# Patient Record
Sex: FEMALE | Race: WHITE | NOT HISPANIC OR LATINO | Employment: PART TIME | URBAN - METROPOLITAN AREA
[De-identification: names, ages, dates, MRNs, and addresses within clinical notes are randomized per-mention and may not be internally consistent; named-entity substitution may affect disease eponyms.]

---

## 2019-04-03 ENCOUNTER — HOSPITAL ENCOUNTER (EMERGENCY)
Facility: CLINIC | Age: 19
Discharge: HOME | End: 2019-04-03
Attending: PHYSICIAN ASSISTANT | Admitting: PHYSICIAN ASSISTANT
Payer: COMMERCIAL

## 2019-04-03 VITALS
TEMPERATURE: 99.8 F | SYSTOLIC BLOOD PRESSURE: 124 MMHG | OXYGEN SATURATION: 100 % | RESPIRATION RATE: 20 BRPM | DIASTOLIC BLOOD PRESSURE: 82 MMHG | HEART RATE: 108 BPM

## 2019-04-03 DIAGNOSIS — N76.0 VAGINITIS AND VULVOVAGINITIS: ICD-10-CM

## 2019-04-03 LAB
HCG UR QL: NEGATIVE
SPECIMEN SOURCE: NORMAL
WET PREP SPEC: NORMAL

## 2019-04-03 PROCEDURE — 99283 EMERGENCY DEPT VISIT LOW MDM: CPT

## 2019-04-03 PROCEDURE — G0463 HOSPITAL OUTPT CLINIC VISIT: HCPCS | Performed by: PHYSICIAN ASSISTANT

## 2019-04-03 PROCEDURE — 99213 OFFICE O/P EST LOW 20 MIN: CPT | Mod: Z6 | Performed by: PHYSICIAN ASSISTANT

## 2019-04-03 PROCEDURE — 87491 CHLMYD TRACH DNA AMP PROBE: CPT | Performed by: PHYSICIAN ASSISTANT

## 2019-04-03 PROCEDURE — 87591 N.GONORRHOEAE DNA AMP PROB: CPT | Performed by: PHYSICIAN ASSISTANT

## 2019-04-03 PROCEDURE — 87210 SMEAR WET MOUNT SALINE/INK: CPT | Performed by: PHYSICIAN ASSISTANT

## 2019-04-03 PROCEDURE — 81025 URINE PREGNANCY TEST: CPT | Performed by: PHYSICIAN ASSISTANT

## 2019-04-03 RX ORDER — FLUCONAZOLE 150 MG/1
TABLET ORAL
Qty: 2 TABLET | Refills: 0 | Status: SHIPPED | OUTPATIENT
Start: 2019-04-03 | End: 2019-04-06

## 2019-04-03 NOTE — ED PROVIDER NOTES
"  History     Chief Complaint   Patient presents with     Vaginitis     HPI  Reyna Rose is a 18 year old female who presents to the urgent care with concerns over vaginal itching and discharge is been present for the last 2 days.  She also states that her LPM was 3/27/19 and was heavy and had more cramping than she typically expected.  She denies any fever, chills, myalgias, cough, nausea, vomiting,diarrhea, dysuria, increased urinary frequency, urgency, hematuria, or current vaginal bleeding.  She has not had any history of similar symptoms previously.  She also reports concern that her boyfriend was recently diagnosed with epididymitis.      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  [facility] [place] [place] and was told that Reyna would grow to only about 5'2\" per Reyna's mom.  0.45% of growth percentile based on stature-for-age.  0.06% of growth percentile based on weight-for-age.  1.92% of growth percentile based on BMI-for-age.           Underweight 12/11/2008     Priority: Medium     April 6, 2009: Andreas Mom is 5'5\" and Reyna's Dad is 5'5\" .  At [place]'s [facility], [facility] [place] and was told that Reyna would grow to only about 5'2\" per Reyna's mom.  0.45% of growth percentile based on stature-for-age.  0.06% of growth percentile based on weight-for-age.  1.92% of growth percentile based on BMI-for-age.         Anxiety NOS 12/11/2008     Priority: Medium     April 6, 2009: Reyna has been followed by psychologist Delia in [facility]. " Reyna has completed her therapy. Reyna is doing well at school and Reyna's mom reports that Delia felt that no further follow up is needed at this time.          Past Medical History:    No past medical history on file.    Past Surgical History:    No past surgical history on file.    Family History:    Family History   Problem Relation Age of Onset     Gastrointestinal Disease Mother         GERD     Lipids Maternal Grandmother      Allergies Maternal Grandmother         many med allergies     Heart Disease Maternal Grandfather      Heart Disease Paternal Grandmother         MI at 53yo     Social History:  Marital Status:  Single [1]  Social History     Tobacco Use     Smoking status: Never Smoker   Substance Use Topics     Alcohol use: No     Drug use: No      Medications:      CHILDRENS MULTIVITAMINS OR CHEW   FLUoxetine (PROZAC) 20 MG/5ML solution     Review of Systems  CONSTITUTIONAL:NEGATIVE for fever, chills, change in weight  INTEGUMENTARY/SKIN: POSITIVE for erythema of external genitalia NEGATIVE for other worrisome rashes, moles or lesions  RESP:NEGATIVE for significant cough or SOB  GI: NEGATIVE for nausea, vomiting, diarrhea, or abdominal pain   : POSITIVE for vaginal itching, discharge NEGATIVE for dysuria, frequency, urgency, hematuria   Physical Exam   BP: 124/82  Pulse: 108  Temp: 99.8  F (37.7  C)  Resp: 20  SpO2: 100 %  Physical Exam  GENERAL APPEARANCE: healthy, alert and no distress  RESP: lungs clear to auscultation - no rales, rhonchi or wheezes  CV: regular rates and rhythm, normal S1 S2, no murmur noted  ABDOMEN:  soft, nontender, no HSM or masses and bowel sounds normal  BACK: No CVA tenderness  GU_female: external genitalia has infrequent pustule lesions, vagina has thick dischargepresent, cervix not visible due to discharge and procedure was discontinued early due to patient's discomfort.    SKIN: no suspicious lesions or rashes  ED Course        Procedures        Critical Care time:   none        No results found for this or any previous visit (from the past 24 hour(s)).    Medications - No data to display    Assessments & Plan (with Medical Decision Making)     I have reviewed the nursing notes.    I have reviewed the findings, diagnosis, plan and need for follow up with the patient.          Medication List      Started    fluconazole 150 MG tablet  Commonly known as:  DIFLUCAN  Take one tablet now, and one tablet in three days          Final diagnoses:   Vaginitis and vulvovaginitis     19-year-old female presents to urgent care with concern over several day history of vaginal itching burning discharge.  She had elevated heart rate upon arrival, remainder of vital signs within normal limits.  Physical exam findings as described above for infrequent internal genitalia white discharge present in the vagina, however speculum exam was discontinued early due to patient's discomfort level.  She had wet prep which was negative for evidence of, bacterial vaginosis, trichomonas.  However, exam was consistent with candidal infection, I discussed risk/benefits of empiric treatment with diflucan and patient elected to proceed.  Testing for chlamydia and gonorrhea pending at time of discharge.  She was discharged home stable with instructions to follow up with PCP if no improvement in 3-5 days.  Worrisome reasons to return to ER/UC sooner discussed.     Disclaimer: This note consists of symbols derived from keyboarding, dictation, and/or voice recognition software. As a result, there may be errors in the script that have gone undetected.  Please consider this when interpreting information found in the chart.    4/3/2019   [facility] EMERGENCY DEPARTMENT     Kelly Rosen PA-C  04/05/19 0930

## 2019-04-03 NOTE — ED AVS SNAPSHOT
[facility] Emergency Department  [address] [address]  Phone:  [phone]  Fax:  [phone]                                    Reyna Rose   MRN: [id number]    Department:  [facility] Emergency Department   Date of Visit:  4/3/2019           After Visit Summary Signature Page    I have received my discharge instructions, and my questions have been answered. I have discussed any challenges I see with this plan with the nurse or doctor.    ..........................................................................................................................................  Patient/Patient Representative Signature      ..........................................................................................................................................  Patient Representative Print Name and Relationship to Patient    ..................................................               ................................................  Date                                   Time    ..........................................................................................................................................  Reviewed by Signature/Title    ...................................................              ..............................................  Date                                               Time          22EPIC Rev 08/18

## 2019-04-04 LAB
C TRACH DNA SPEC QL NAA+PROBE: NEGATIVE
N GONORRHOEA DNA SPEC QL NAA+PROBE: NEGATIVE
SPECIMEN SOURCE: NORMAL
SPECIMEN SOURCE: NORMAL

## 2019-04-04 NOTE — RESULT ENCOUNTER NOTE
Final result for both N. Gonorrhoeae PCR and Chlamydia Trachomatis PCR are NEGATIVE.  No treatment or change in treatment per [place] ED Lab Result protocol.

## 2020-11-19 ENCOUNTER — HOSPITAL ENCOUNTER (EMERGENCY)
Facility: CLINIC | Age: 20
Discharge: HOME | End: 2020-11-19
Attending: PHYSICIAN ASSISTANT | Admitting: PHYSICIAN ASSISTANT
Payer: COMMERCIAL

## 2020-11-19 VITALS
RESPIRATION RATE: 16 BRPM | SYSTOLIC BLOOD PRESSURE: 136 MMHG | TEMPERATURE: 97.4 F | WEIGHT: 83 LBS | OXYGEN SATURATION: 100 % | DIASTOLIC BLOOD PRESSURE: 85 MMHG | HEART RATE: 97 BPM

## 2020-11-19 DIAGNOSIS — N89.8 VAGINAL DISCHARGE: ICD-10-CM

## 2020-11-19 DIAGNOSIS — N39.0 URINARY TRACT INFECTION: ICD-10-CM

## 2020-11-19 LAB
ALBUMIN UR-MCNC: 100 MG/DL
APPEARANCE UR: ABNORMAL
BACTERIA #/AREA URNS HPF: ABNORMAL /HPF
BILIRUB UR QL STRIP: NEGATIVE
COLOR UR AUTO: YELLOW
GLUCOSE UR STRIP-MCNC: NEGATIVE MG/DL
HCG UR QL: NEGATIVE
HGB UR QL STRIP: ABNORMAL
KETONES UR STRIP-MCNC: NEGATIVE MG/DL
LEUKOCYTE ESTERASE UR QL STRIP: ABNORMAL
MUCOUS THREADS #/AREA URNS LPF: PRESENT /LPF
NITRATE UR QL: NEGATIVE
PH UR STRIP: 5 PH (ref 5–7)
RBC #/AREA URNS AUTO: >182 /HPF (ref 0–2)
SOURCE: ABNORMAL
SP GR UR STRIP: 1.03 (ref 1–1.03)
SPECIMEN SOURCE: NORMAL
SQUAMOUS #/AREA URNS AUTO: 2 /HPF (ref 0–1)
UROBILINOGEN UR STRIP-MCNC: 2 MG/DL (ref 0–2)
WBC #/AREA URNS AUTO: 38 /HPF (ref 0–5)
WET PREP SPEC: NORMAL

## 2020-11-19 PROCEDURE — 87086 URINE CULTURE/COLONY COUNT: CPT | Performed by: PHYSICIAN ASSISTANT

## 2020-11-19 PROCEDURE — 87210 SMEAR WET MOUNT SALINE/INK: CPT | Performed by: PHYSICIAN ASSISTANT

## 2020-11-19 PROCEDURE — 87591 N.GONORRHOEAE DNA AMP PROB: CPT | Performed by: PHYSICIAN ASSISTANT

## 2020-11-19 PROCEDURE — 81001 URINALYSIS AUTO W/SCOPE: CPT | Performed by: PHYSICIAN ASSISTANT

## 2020-11-19 PROCEDURE — 87491 CHLMYD TRACH DNA AMP PROBE: CPT | Performed by: PHYSICIAN ASSISTANT

## 2020-11-19 PROCEDURE — 99214 OFFICE O/P EST MOD 30 MIN: CPT | Performed by: PHYSICIAN ASSISTANT

## 2020-11-19 PROCEDURE — G0463 HOSPITAL OUTPT CLINIC VISIT: HCPCS | Performed by: PHYSICIAN ASSISTANT

## 2020-11-19 PROCEDURE — 81025 URINE PREGNANCY TEST: CPT | Performed by: PHYSICIAN ASSISTANT

## 2020-11-19 RX ORDER — CEPHALEXIN 250 MG/5ML
500 POWDER, FOR SUSPENSION ORAL 2 TIMES DAILY
Qty: 140 ML | Refills: 0 | Status: SHIPPED | OUTPATIENT
Start: 2020-11-19 | End: 2020-11-26

## 2020-11-19 ASSESSMENT — ENCOUNTER SYMPTOMS
MUSCULOSKELETAL NEGATIVE: 1
HEMATURIA: 1
CONSTITUTIONAL NEGATIVE: 1
GASTROINTESTINAL NEGATIVE: 1
DYSURIA: 1
FREQUENCY: 1

## 2020-11-19 NOTE — ED AVS SNAPSHOT
[facility] Emergency Dept  [address] [address]  Phone: [phone]  Fax: [phone]                                    Reyna Rose   MRN: [id number]    Department: [facility] Emergency Dept   Date of Visit: 11/19/2020           After Visit Summary Signature Page    I have received my discharge instructions, and my questions have been answered. I have discussed any challenges I see with this plan with the nurse or doctor.    ..........................................................................................................................................  Patient/Patient Representative Signature      ..........................................................................................................................................  Patient Representative Print Name and Relationship to Patient    ..................................................               ................................................  Date                                   Time    ..........................................................................................................................................  Reviewed by Signature/Title    ...................................................              ..............................................  Date                                               Time          22EPIC Rev 08/18

## 2020-11-20 ENCOUNTER — TELEPHONE (OUTPATIENT)
Dept: EMERGENCY MEDICINE | Facility: CLINIC | Age: 20
End: 2020-11-20

## 2020-11-20 LAB
BACTERIA SPEC CULT: NO GROWTH
C TRACH DNA SPEC QL NAA+PROBE: POSITIVE
Lab: NORMAL
N GONORRHOEA DNA SPEC QL NAA+PROBE: NEGATIVE
SPECIMEN SOURCE: ABNORMAL
SPECIMEN SOURCE: NORMAL
SPECIMEN SOURCE: NORMAL

## 2020-11-20 RX ORDER — ONDANSETRON 4 MG/1
4 TABLET, ORALLY DISINTEGRATING ORAL ONCE
Qty: 1 TABLET | Refills: 0 | Status: SHIPPED | OUTPATIENT
Start: 2020-11-20 | End: 2020-11-20

## 2020-11-20 RX ORDER — AZITHROMYCIN 500 MG/1
1000 TABLET, FILM COATED ORAL ONCE
Qty: 2 TABLET | Refills: 0 | Status: SHIPPED | OUTPATIENT
Start: 2020-11-20 | End: 2020-11-20

## 2020-11-20 NOTE — RESULT ENCOUNTER NOTE
Final Chlamydia trachomatis PCR on 11/20/20 is POSITIVE for C. trachomatis rRNA by transcription mediated amplification.  Patient was treated appropriately in the ED [Yes or No]:   No         [facility] ED discharge antibiotic (if prescribed): Cephalexin (Keflex) 250 MG/5ML susp, 10 mLs (500 mg) by mouth 2 times daily for 7 days  If no treatment initiated in the [place] ED, treat per [place] ED Lab Result protocol.

## 2020-11-20 NOTE — TELEPHONE ENCOUNTER
[facility] Emergency Department Lab result notification [Adult-Female]    [place] ED lab result protocol used  Chlamydia T    Reason for call  Notify of lab results, assess symptoms,  review ED providers recommendations/discharge instructions (if necessary) and advise per ED lab result f/u protocol    Lab Result (including Rx patient on, if applicable)  Final Chlamydia trachomatis PCR on 11/20/20 is POSITIVE for C. trachomatis rRNA by transcription mediated amplification.  Patient was treated appropriately in the ED [Yes or No]:   No         [facility] ED discharge antibiotic (if prescribed): Cephalexin (Keflex) 250 MG/5ML susp, 10 mLs (500 mg) by mouth 2 times daily for 7 days  If no treatment initiated in the [place] ED, treat per [place] ED Lab Result protocol.    Information table from ED Provider visit on 11/19/20  Symptoms reported at ED visit (Chief complaint, HPI) Chief Complaint   Patient presents with     Rule out Urinary Tract Infection      HPI  Reyna Rose is a 20 year old female who presents with complaints of dysuria, hematuria, and increased urinary urgency and frequency.  Pt also c/o a new brownish vaginal discharge.  Pt also c/o associated suprapubic abdominal pressure and diffuse low back pain.  Denies fevers, chills, nausea, vomiting, or flank pain.  Pt has had a new sexual partner.  No known STD exposure.     Significant Medical hx, if applicable (i.e. CKD, diabetes) NA   Allergies No Known Allergies   Weight, if applicable Wt Readings from Last 2 Encounters:   11/19/20 37.6 kg (83 lb)   09/20/15 35.3 kg (77 lb 12.8 oz) (<1 %, Z= -3.95)*     * Growth percentiles are based on CDC (Girls, 2-20 Years) data.      Coumadin/Warfarin [Yes /No] No   Creatinine Level (mg/dl) Creatinine   Date Value Ref Range Status   04/06/2009 0.56 0.39 - 0.73 mg/dL Final     Comment:     New IDMS-traceable calibration  beginning 5/1/08      Creatinine clearance (ml/min), if applicable  Creatinine clearance cannot be calculated (Patient's most recent lab result is older than the maximum 10 days allowed.)   Pregnant (Yes/No/NA) HCG qual negative   Breastfeeding (Yes/No/NA) ?   ED providers Impression and Plan (applicable information) Pt is a 20 year old female who presents with complaints of dysuria, hematuria, and increased urinary urgency and frequency.  Pt also c/o a new brownish vaginal discharge.  Pt also c/o associated suprapubic abdominal pressure and diffuse low back pain.  Denies fevers, chills, nausea, vomiting, or flank pain.  Pt has had a new sexual partner.  No known STD exposure.      Pt is afebrile on arrival.  Exam as above.  Urinalysis was positive for >182 RBCs and 38 WBCs.  Culture is pending.  UPT was negative.  Wet prep was negative.  Gonorrhea and Chlamydia PCRs are pending.  Discussed results with patient.  Return precautions were reviewed.  Hand-outs were provided.     Patient was sent with Keflex and was instructed to follow-up with PCP if no improvement in 3-5 days for continued care and management or sooner if new or worsening symptoms.  She is to return to the ED for persistent and/or worsening symptoms.  Patient expressed understanding of the diagnosis and plan and was discharged home in good condition.   ED diagnosis Urinary tract infection   Vaginal discharge      ED provider Rosalina Saha PA-C      RN Assessment (Patient s current Symptoms), include time called.  [Insert Left message here if message left]  At 1:15P, Left voicemail message requesting a call back to [facility] ED Lab Result RN at [phone].  RN is available every day between 10 a.m. and 6:30 p.m..      [RN Name]  Tom Graham RN  [facility]  Emergency Dept Lab Result RN  Ph# [phone]    Copy of Lab result   Component      Latest Ref Rng & Units 11/19/2020   Specimen Description       Vagina   Chlamydia Trachomatis PCR      NEG:Negative Positive (A)    Specimen Descrip       Vagina   N Gonorrhea PCR      NEG:Negative Negative

## 2020-11-20 NOTE — RESULT ENCOUNTER NOTE
Final N. Gonorrhoeae PCR is NEGATIVE.   No treatment or change in treatment per [place] ED Lab Result protocol.

## 2020-11-20 NOTE — RESULT ENCOUNTER NOTE
Notified of positive chlamydia T PCR result and treatment recommendations.  Rx for Azithromycin 1000 mg Tablet, I tablet PO x 1 and Zofran 4 mg ODT (take 1 hour prior to taking the Azithromycin)

## 2020-11-20 NOTE — RESULT ENCOUNTER NOTE
Emergency Dept/Urgent Care discharge antibiotic (if prescribed): Cephalexin (Keflex) 250 MG/5ML susp, 10 mLs (500 mg) by mouth 2 times daily for 7 days  Date of Rx (if applicable):  11/19/20  No changes in treatment per Urine culture protocol.

## 2020-11-20 NOTE — TELEPHONE ENCOUNTER
[facility] Emergency Department/Urgent Care Lab result notification     Patient/parent Name  Reyna    RN Assessment (Patient s current Symptoms), include time called.  [Insert Left message here if message left]  Continues to have discomfort with urination,  Urgency and frequency    Lab result (if applicable):  Final Chlamydia trachomatis PCR on 11/20/20 is POSITIVE for C. trachomatis rRNA by transcription mediated amplification.  Patient was treated appropriately in the ED [Yes or No]:   No         [facility] ED discharge antibiotic (if prescribed): Cephalexin (Keflex) 250 MG/5ML susp, 10 mLs (500 mg) by mouth 2 times daily for 7 days  If no treatment initiated in the [place] ED, treat per [place] ED Lab Result protocol.    RN Recommendations/Instructions per [place] ED lab result protocol  Notified of positive chlamydia T PCR result and treatment recommendations.  Rx for Azithromycin 1000 mg Tablet, I tablet PO x 1 and Zofran 4 mg ODT (take 1 hour prior to taking the Azithromycin)    Reviewed STD Patient Instructions:    We recommend that you contact any recent sexual partners within the last 2 months and have them evaluated by a physician.    Avoid sexual activity for 7 to 10 days or until both your and your partner(s) have completed all antibiotic medications.    We advise that you consider following up with your PCP at approximately 3 months for retesting to be sure the infection has cleared.       Please Contact your PCP clinic or return to the Emergency department if your:    Symptoms worsen or other concerning symptom's.    PCP follow-up Questions asked: NO    [RN Name]  Tom Graham RN  [facility]  Emergency Dept Lab Result RN  Ph# [phone]

## 2020-11-20 NOTE — ED PROVIDER NOTES
"  History     Chief Complaint   Patient presents with     Rule out Urinary Tract Infection     HPI  Reyna Rose is a 20 year old female who presents with complaints of dysuria, hematuria, and increased urinary urgency and frequency.  Pt also c/o a new brownish vaginal discharge.  Pt also c/o associated suprapubic abdominal pressure and diffuse low back pain.  Denies fevers, chills, nausea, vomiting, or flank pain.  Pt has had a new sexual partner.  No known STD exposure.      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  At Reyna's previous clinic, [facility] in [place], Reyna had a bone scan and was told that Reyna would grow to only about 5'2\" per Reyna's mom.  0.45% of growth percentile based on stature-for-age.  0.06% of growth percentile based on weight-for-age.  1.92% of growth percentile based on BMI-for-age.           Underweight 12/11/2008     Priority: Medium     April 6, 2009: Reyna's Mom is 5'5\" and Reyna's Dad is 5'5\" .  At Reyna's previous clinic, [facility] in [place], Reyna had a bone scan and was told that Reyna would grow to only about 5'2\" per Reyna's mom.  0.45% of growth percentile based on stature-for-age.  0.06% of growth percentile based on weight-for-age.  1.92% of growth percentile based on BMI-for-age.         Anxiety NOS 12/11/2008     Priority: Medium     April 6, 2009: Reyna has been followed by psychologist Delia in [facility]. Reyna has completed her therapy. Reyna is doing well at school and Reyna's mom reports that Delia felt that no further follow up is needed at this time.          Past " Medical History:    No past medical history on file.    Past Surgical History:    No past surgical history on file.    Family History:    Family History   Problem Relation Age of Onset     Gastrointestinal Disease Mother         GERD     Lipids Maternal Grandmother      Allergies Maternal Grandmother         many med allergies     Heart Disease Maternal Grandfather      Heart Disease Paternal Grandmother         MI at 53yo       Social History:  Marital Status:  Single [1]  Social History     Tobacco Use     Smoking status: Never Smoker   Substance Use Topics     Alcohol use: No     Drug use: No        Medications:         cephALEXin (KEFLEX) 250 MG/5ML suspension       CHILDRENS MULTIVITAMINS OR CHEW       FLUoxetine (PROZAC) 20 MG/5ML solution          Review of Systems   Constitutional: Negative.    Gastrointestinal: Negative.    Genitourinary: Positive for dysuria, frequency, hematuria, urgency and vaginal discharge.   Musculoskeletal: Negative.    Skin: Negative.    All other systems reviewed and are negative.      Physical Exam   BP: 136/85  Pulse: 97  Temp: 97.4  F (36.3  C)  Resp: 16  Weight: 37.6 kg (83 lb)  SpO2: 100 %      Physical Exam  Constitutional:       General: She is not in acute distress.     Appearance: Normal appearance. She is not ill-appearing, toxic-appearing or diaphoretic.   HENT:      Head: Normocephalic and atraumatic.      Mouth/Throat:      Mouth: Mucous membranes are moist.   Eyes:      Conjunctiva/sclera: Conjunctivae normal.   Neck:      Musculoskeletal: Neck supple.   Pulmonary:      Effort: Pulmonary effort is normal.   Abdominal:      General: There is no distension.      Palpations: Abdomen is soft.      Tenderness: There is no abdominal tenderness. There is no right CVA tenderness, left CVA tenderness, guarding or rebound.   Genitourinary:     Labia:         Right: No rash, tenderness or lesion.         Left: No rash, tenderness or lesion.       Vagina: Vaginal discharge  present. No tenderness, bleeding or lesions.      Cervix: Friability present. No cervical motion tenderness.      Rectum: External hemorrhoid:     Musculoskeletal: Normal range of motion.   Skin:     General: Skin is warm and dry.   Neurological:      General: No focal deficit present.      Mental Status: She is alert.         ED Course        Procedures    Results for orders placed or performed during the hospital encounter of 11/19/20 (from the past 24 hour(s))   UA reflex to Microscopic   Result Value Ref Range    Color Urine Yellow     Appearance Urine Slightly Cloudy     Glucose Urine Negative NEG^Negative mg/dL    Bilirubin Urine Negative NEG^Negative    Ketones Urine Negative NEG^Negative mg/dL    Specific Gravity Urine 1.032 1.003 - 1.035    Blood Urine Moderate (A) NEG^Negative    pH Urine 5.0 5.0 - 7.0 pH    Protein Albumin Urine 100 (A) NEG^Negative mg/dL    Urobilinogen mg/dL 2.0 0.0 - 2.0 mg/dL    Nitrite Urine Negative NEG^Negative    Leukocyte Esterase Urine Small (A) NEG^Negative    Source Midstream Urine     RBC Urine >182 (H) 0 - 2 /HPF    WBC Urine 38 (H) 0 - 5 /HPF    Bacteria Urine Few (A) NEG^Negative /HPF    Squamous Epithelial /HPF Urine 2 (H) 0 - 1 /HPF    Mucous Urine Present (A) NEG^Negative /LPF   HCG qualitative urine (UPT)   Result Value Ref Range    HCG Qual Urine Negative NEG^Negative   Wet prep    Specimen: Vagina   Result Value Ref Range    Specimen Description Vagina     Wet Prep WBC'S seen  Moderate       Wet Prep No yeast seen     Wet Prep No clue cells seen     Wet Prep No Trichomonas seen        Medications - No data to display    Assessments & Plan (with Medical Decision Making)     Pt is a 20 year old female who presents with complaints of dysuria, hematuria, and increased urinary urgency and frequency.  Pt also c/o a new brownish vaginal discharge.  Pt also c/o associated suprapubic abdominal pressure and diffuse low back pain.  Denies fevers, chills, nausea, vomiting, or  flank pain.  Pt has had a new sexual partner.  No known STD exposure.     Pt is afebrile on arrival.  Exam as above.  Urinalysis was positive for >182 RBCs and 38 WBCs.  Culture is pending.  UPT was negative.  Wet prep was negative.  Gonorrhea and Chlamydia PCRs are pending.  Discussed results with patient.  Return precautions were reviewed.  Hand-outs were provided.    Patient was sent with Keflex and was instructed to follow-up with PCP if no improvement in 3-5 days for continued care and management or sooner if new or worsening symptoms.  She is to return to the ED for persistent and/or worsening symptoms.  Patient expressed understanding of the diagnosis and plan and was discharged home in good condition.    I have reviewed the nursing notes.    I have reviewed the findings, diagnosis, plan and need for follow up with the patient.    New Prescriptions    CEPHALEXIN (KEFLEX) 250 MG/5ML SUSPENSION    Take 10 mLs (500 mg) by mouth 2 times daily for 7 days       Final diagnoses:   Urinary tract infection   Vaginal discharge       11/19/2020   [facility] EMERGENCY DEPT      Disclaimer:  This note consists of symbols derived from keyboarding, dictation and/or voice recognition software.  As a result, there may be errors in the script that have gone undetected.  Please consider this when interpreting information found in this chart.     Rosalina Saha PA-C  11/19/20 1951

## 2020-11-22 NOTE — TELEPHONE ENCOUNTER
"[facility] Emergency Department/Urgent Care Lab result notification:    [place] ED lab result protocol used  Urine culture    Reason for call  Notify of lab results, assess symptoms,  review ED providers recommendations/discharge instructions (if necessary) and advise per ED lab result f/u protocol    Lab Result   Final urine culture report shows \"NO GROWTH\" and is NEGATIVE.  Emergency Dept discharge antibiotic: Cephalexin (Keflex) 250 MG/5ML susp, 10 mLs (500 mg) by mouth 2 times daily for 7 days  Is ED discharge Rx antibiotic for UTI only (Yes/No): Yes  Recommendations per [place] ED Lab result protocol - Urine culture protocol.    Information table from ED Provider visit on 11/19/20  Symptoms reported at ED visit (Chief complaint, HPI) Chief Complaint   Patient presents with     Rule out Urinary Tract Infection      HPI  Reyna Rose is a 20 year old female who presents with complaints of dysuria, hematuria, and increased urinary urgency and frequency.  Pt also c/o a new brownish vaginal discharge.  Pt also c/o associated suprapubic abdominal pressure and diffuse low back pain.  Denies fevers, chills, nausea, vomiting, or flank pain.  Pt has had a new sexual partner.  No known STD exposure.     ED providers Impression and Plan (applicable information) Pt is a 20 year old female who presents with complaints of dysuria, hematuria, and increased urinary urgency and frequency.  Pt also c/o a new brownish vaginal discharge.  Pt also c/o associated suprapubic abdominal pressure and diffuse low back pain.  Denies fevers, chills, nausea, vomiting, or flank pain.  Pt has had a new sexual partner.  No known STD exposure.      Pt is afebrile on arrival.  Exam as above.  Urinalysis was positive for >182 RBCs and 38 WBCs.  Culture is pending.  UPT was negative.  Wet prep was negative.  Gonorrhea and Chlamydia PCRs are pending.  Discussed results with patient.  Return precautions were reviewed.  Hand-outs " "were provided.     Patient was sent with Keflex and was instructed to follow-up with PCP if no improvement in 3-5 days for continued care and management or sooner if new or worsening symptoms.  She is to return to the ED for persistent and/or worsening symptoms.  Patient expressed understanding of the diagnosis and plan and was discharged home in good condition.   Miscellaneous information NA     RN Assessment (Patient s current Symptoms), include time called.  [Insert Left message here if message left]  \"I felt nauseous after taking the Antibiotic that you gave me last night.  Feeling better today.    Urinary sx's still persisting.      RN Recommendations/Instructions per [place] ED lab result protocol  Patient notified of lab result and treatment recommendations.    Confirmed she did not take any antibiotic's prior to the ED.  Advised to discontinue the Cephalexin/Keflex    Please Contact your PCP clinic or return to the Emergency department if your:    Symptoms do not improve after 3 days on antibiotic.    Symptoms worsen or other concerning symptom's.      [RN Name]  Tom Graham RN  [employer] [facility]  Emergency Dept Lab Result RN  Ph# [phone]      Copy of Lab result   Urine Culture  Order: [id number]  Status:  Final result   Visible to patient:  No (inaccessible in MyChart)   Dx:  Urinary tract infection  Specimen Information: Midstream Urine        (important suggestion)  Newer results are available. Click to view them now.   Component 2d ago   Specimen Description Midstream Urine    Special Requests Specimen received in preservative    Culture Micro No growth    [facility]         Specimen Collected: 11/19/20  6:53 PM   Last Resulted: 11/20/20  9:54 PM               "

## 2022-02-13 ENCOUNTER — HOSPITAL ENCOUNTER (EMERGENCY)
Facility: CLINIC | Age: 22
Discharge: HOME | End: 2022-02-13
Attending: FAMILY MEDICINE | Admitting: FAMILY MEDICINE
Payer: COMMERCIAL

## 2022-02-13 VITALS
WEIGHT: 89 LBS | RESPIRATION RATE: 14 BRPM | OXYGEN SATURATION: 99 % | TEMPERATURE: 97.6 F | DIASTOLIC BLOOD PRESSURE: 85 MMHG | SYSTOLIC BLOOD PRESSURE: 123 MMHG | HEART RATE: 96 BPM

## 2022-02-13 DIAGNOSIS — N30.90 BLADDER INFECTION: ICD-10-CM

## 2022-02-13 LAB
ALBUMIN UR-MCNC: 30 MG/DL
APPEARANCE UR: ABNORMAL
BILIRUB UR QL STRIP: NEGATIVE
COLOR UR AUTO: YELLOW
GLUCOSE UR STRIP-MCNC: NEGATIVE MG/DL
HGB UR QL STRIP: ABNORMAL
KETONES UR STRIP-MCNC: NEGATIVE MG/DL
LEUKOCYTE ESTERASE UR QL STRIP: ABNORMAL
MUCOUS THREADS #/AREA URNS LPF: PRESENT /LPF
NITRATE UR QL: NEGATIVE
PH UR STRIP: 6 [PH] (ref 5–7)
RBC URINE: 98 /HPF
SP GR UR STRIP: 1.02 (ref 1–1.03)
SQUAMOUS EPITHELIAL: 1 /HPF
UROBILINOGEN UR STRIP-MCNC: NORMAL MG/DL
WBC URINE: 146 /HPF

## 2022-02-13 PROCEDURE — 87186 SC STD MICRODIL/AGAR DIL: CPT | Performed by: FAMILY MEDICINE

## 2022-02-13 PROCEDURE — 99213 OFFICE O/P EST LOW 20 MIN: CPT | Performed by: FAMILY MEDICINE

## 2022-02-13 PROCEDURE — 81001 URINALYSIS AUTO W/SCOPE: CPT | Performed by: FAMILY MEDICINE

## 2022-02-13 PROCEDURE — G0463 HOSPITAL OUTPT CLINIC VISIT: HCPCS | Performed by: FAMILY MEDICINE

## 2022-02-13 RX ORDER — CIPROFLOXACIN 500 MG/1
500 TABLET, FILM COATED ORAL 2 TIMES DAILY
Qty: 10 TABLET | Refills: 0 | Status: SHIPPED | OUTPATIENT
Start: 2022-02-13 | End: 2022-02-13

## 2022-02-13 RX ORDER — FLUCONAZOLE 150 MG/1
TABLET ORAL
Qty: 2 TABLET | Refills: 0 | Status: SHIPPED | OUTPATIENT
Start: 2022-02-13 | End: 2022-02-16

## 2022-02-13 RX ORDER — OMEPRAZOLE 10 MG/1
20 CAPSULE, DELAYED RELEASE ORAL DAILY
COMMUNITY

## 2022-02-13 RX ORDER — CIPROFLOXACIN 500 MG/1
500 TABLET, FILM COATED ORAL 2 TIMES DAILY
Qty: 10 TABLET | Refills: 0 | Status: SHIPPED | OUTPATIENT
Start: 2022-02-13 | End: 2024-02-29

## 2022-02-13 RX ORDER — DESOGESTREL AND ETHINYL ESTRADIOL 0.15-0.03
1 KIT ORAL DAILY
COMMUNITY
Start: 2022-01-24 | End: 2024-02-29

## 2022-02-13 ASSESSMENT — ENCOUNTER SYMPTOMS
ALLERGIC/IMMUNOLOGIC NEGATIVE: 1
CONSTITUTIONAL NEGATIVE: 1
PSYCHIATRIC NEGATIVE: 1
EYES NEGATIVE: 1
FLANK PAIN: 1
DYSURIA: 1
ENDOCRINE NEGATIVE: 1
ABDOMINAL PAIN: 1
RESPIRATORY NEGATIVE: 1
HEMATOLOGIC/LYMPHATIC NEGATIVE: 1
NEUROLOGICAL NEGATIVE: 1
FREQUENCY: 1
CARDIOVASCULAR NEGATIVE: 1

## 2022-02-13 NOTE — ED TRIAGE NOTES
Lower back pain and frequency beginning Friday afternoon. Urgency beginning last night. Heavy period began Thursday night; much heavier than normal.

## 2022-02-14 NOTE — ED PROVIDER NOTES
"  History     Chief Complaint   Patient presents with     Rule out Urinary Tract Infection     HPI  Reyna Rose is a 21 year old female who comes in for a possible bladder infection. Patient reports that her symptoms started today. She has had urgency , mild burning and low back pain. No fevers or chills. She says she may have had a UTI in the past but she is not sure.    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  At Reyna's previous clinic, [facility] in [place], Reyna had a bone scan and was told that Reyna would grow to only about 5'2\" per Reyna's mom.  0.45% of growth percentile based on stature-for-age.  0.06% of growth percentile based on weight-for-age.  1.92% of growth percentile based on BMI-for-age.           Underweight 12/11/2008     Priority: Medium     April 6, 2009: Reyna's Mom is 5'5\" and Reyna's Dad is 5'5\" .  At Reyna's previous clinic, [facility] in [place], Reyna had a bone scan and was told that Reyna would grow to only about 5'2\" per Reyna's mom.  0.45% of growth percentile based on stature-for-age.  0.06% of growth percentile based on weight-for-age.  1.92% of growth percentile based on BMI-for-age.         Anxiety NOS 12/11/2008     Priority: Medium     April 6, 2009: Reyna has been followed by psychologist Delia in [facility]. Reyna has completed her therapy. Reyna is doing well at school and Reyna's mom reports that Delia felt that no further follow up is needed at this time.          Past Medical History:    No past medical history on file.    Past Surgical History:    No past surgical history on " file.    Family History:    Family History   Problem Relation Age of Onset     Gastrointestinal Disease Mother         GERD     Lipids Maternal Grandmother      Allergies Maternal Grandmother         many med allergies     Heart Disease Maternal Grandfather      Heart Disease Paternal Grandmother         MI at 53yo       Social History:  Marital Status:  Single [1]  Social History     Tobacco Use     Smoking status: Never Smoker     Smokeless tobacco: Not on file   Substance Use Topics     Alcohol use: No     Drug use: No        Medications:    ciprofloxacin (CIPRO) 500 MG tablet  omeprazole (PRILOSEC) 10 MG DR capsule  ENSKYCE 0.15-30 MG-MCG tablet          Review of Systems   Constitutional: Negative.    HENT: Negative.    Eyes: Negative.    Respiratory: Negative.    Cardiovascular: Negative.    Gastrointestinal: Positive for abdominal pain.   Endocrine: Negative.    Genitourinary: Positive for dysuria, flank pain, frequency and urgency.   Allergic/Immunologic: Negative.    Neurological: Negative.    Hematological: Negative.    Psychiatric/Behavioral: Negative.        Physical Exam   BP: 123/85  Pulse: 96  Temp: 97.6  F (36.4  C)  Resp: 14  Weight: 40.4 kg (89 lb)  SpO2: 99 %      Physical Exam  Constitutional:       Appearance: Normal appearance.   HENT:      Head: Normocephalic and atraumatic.      Right Ear: Tympanic membrane normal.      Left Ear: Tympanic membrane normal.   Eyes:      Pupils: Pupils are equal, round, and reactive to light.   Cardiovascular:      Rate and Rhythm: Normal rate and regular rhythm.      Pulses: Normal pulses.      Heart sounds: Normal heart sounds.   Pulmonary:      Effort: Pulmonary effort is normal.      Breath sounds: Normal breath sounds.   Abdominal:      General: Abdomen is flat. Bowel sounds are normal.      Palpations: Abdomen is soft.   Neurological:      Mental Status: She is alert.   Psychiatric:         Mood and Affect: Mood normal.         Behavior: Behavior normal.          ED Course                 Procedures                  Results for orders placed or performed during the hospital encounter of 02/13/22 (from the past 24 hour(s))   UA reflex to Microscopic and Culture    Specimen: Urine, Clean Catch   Result Value Ref Range    Color Urine Yellow Colorless, Straw, Light Yellow, Yellow    Appearance Urine Slightly Cloudy (A) Clear    Glucose Urine Negative Negative mg/dL    Bilirubin Urine Negative Negative    Ketones Urine Negative Negative mg/dL    Specific Gravity Urine 1.025 1.003 - 1.035    Blood Urine Large (A) Negative    pH Urine 6.0 5.0 - 7.0    Protein Albumin Urine 30  (A) Negative mg/dL    Urobilinogen Urine Normal Normal, 2.0 mg/dL    Nitrite Urine Negative Negative    Leukocyte Esterase Urine Moderate (A) Negative    Mucus Urine Present (A) None Seen /LPF    RBC Urine 98 (H) <=2 /HPF    WBC Urine 146 (H) <=5 /HPF    Squamous Epithelials Urine 1 <=1 /HPF    Narrative    Urine Culture ordered based on laboratory criteria       Medications - No data to display    Assessments & Plan (with Medical Decision Making)     I have reviewed the nursing notes.    I have reviewed the findings, diagnosis, plan and need for follow up with the patient.      New Prescriptions    CIPROFLOXACIN (CIPRO) 500 MG TABLET    Take 1 tablet (500 mg) by mouth 2 times daily       Final diagnoses:   Bladder infection   dicussed labs with patient. Her urine was suggestive of a bladder infection , antibiotics faxed. Recommend increase in fluids.    2/13/2022   [facility] EMERGENCY DEPT     Ivy Freeman MD  02/13/22 0305

## 2022-02-15 LAB — BACTERIA UR CULT: ABNORMAL

## 2022-02-27 ENCOUNTER — HOSPITAL ENCOUNTER (EMERGENCY)
Facility: CLINIC | Age: 22
Discharge: HOME | End: 2022-02-27
Attending: EMERGENCY MEDICINE | Admitting: EMERGENCY MEDICINE
Payer: COMMERCIAL

## 2022-02-27 VITALS
OXYGEN SATURATION: 99 % | TEMPERATURE: 98.4 F | WEIGHT: 90 LBS | RESPIRATION RATE: 16 BRPM | SYSTOLIC BLOOD PRESSURE: 123 MMHG | HEART RATE: 69 BPM | DIASTOLIC BLOOD PRESSURE: 84 MMHG

## 2022-02-27 DIAGNOSIS — N10 ACUTE PYELONEPHRITIS: ICD-10-CM

## 2022-02-27 LAB
ALBUMIN UR-MCNC: NEGATIVE MG/DL
APPEARANCE UR: ABNORMAL
BACTERIA #/AREA URNS HPF: ABNORMAL /HPF
BILIRUB UR QL STRIP: NEGATIVE
COLOR UR AUTO: YELLOW
GLUCOSE UR STRIP-MCNC: NEGATIVE MG/DL
HCG UR QL: NEGATIVE
HGB UR QL STRIP: ABNORMAL
KETONES UR STRIP-MCNC: NEGATIVE MG/DL
LEUKOCYTE ESTERASE UR QL STRIP: ABNORMAL
MUCOUS THREADS #/AREA URNS LPF: PRESENT /LPF
NITRATE UR QL: NEGATIVE
PH UR STRIP: 6.5 [PH] (ref 5–7)
RBC URINE: 75 /HPF
SP GR UR STRIP: 1.03 (ref 1–1.03)
SQUAMOUS EPITHELIAL: 2 /HPF
UROBILINOGEN UR STRIP-MCNC: NORMAL MG/DL
WBC URINE: 20 /HPF

## 2022-02-27 PROCEDURE — 99284 EMERGENCY DEPT VISIT MOD MDM: CPT | Performed by: EMERGENCY MEDICINE

## 2022-02-27 PROCEDURE — 87591 N.GONORRHOEAE DNA AMP PROB: CPT | Performed by: EMERGENCY MEDICINE

## 2022-02-27 PROCEDURE — 87186 SC STD MICRODIL/AGAR DIL: CPT | Performed by: EMERGENCY MEDICINE

## 2022-02-27 PROCEDURE — 81001 URINALYSIS AUTO W/SCOPE: CPT | Performed by: EMERGENCY MEDICINE

## 2022-02-27 PROCEDURE — 87491 CHLMYD TRACH DNA AMP PROBE: CPT | Performed by: EMERGENCY MEDICINE

## 2022-02-27 PROCEDURE — 99283 EMERGENCY DEPT VISIT LOW MDM: CPT

## 2022-02-27 PROCEDURE — 81025 URINE PREGNANCY TEST: CPT | Performed by: EMERGENCY MEDICINE

## 2022-02-27 RX ORDER — CEPHALEXIN 250 MG/5ML
50 POWDER, FOR SUSPENSION ORAL 3 TIMES DAILY
Qty: 285.6 ML | Refills: 0 | Status: SHIPPED | OUTPATIENT
Start: 2022-03-02 | End: 2022-03-09

## 2022-02-27 RX ORDER — ONDANSETRON 4 MG/1
4 TABLET, ORALLY DISINTEGRATING ORAL EVERY 8 HOURS PRN
Qty: 10 TABLET | Refills: 0 | Status: SHIPPED | OUTPATIENT
Start: 2022-02-27 | End: 2024-02-29

## 2022-02-27 RX ORDER — CEPHALEXIN 250 MG/5ML
500 POWDER, FOR SUSPENSION ORAL 3 TIMES DAILY
Qty: 100 ML | Refills: 0 | Status: SHIPPED | OUTPATIENT
Start: 2022-02-27 | End: 2024-02-29

## 2022-02-28 LAB
C TRACH DNA SPEC QL NAA+PROBE: NEGATIVE
N GONORRHOEA DNA SPEC QL NAA+PROBE: NEGATIVE

## 2022-02-28 NOTE — ED NOTES
Pt states that she noted frequency and hematuria today. She was Tx for a UTI 2 weeks ago and was on antibiotics, she has remained on another antibiotic for dental problem. She states she was not checked for STD last visit and wonders if that may be a problem. Pain with urinaiton

## 2022-02-28 NOTE — ED NOTES
Writer contacted by [facility] pharmacy. They are unable to fill the Keflex r/t [facility] stating the medication filled should last until 3/7/2022. Writer called Jaja

## 2022-02-28 NOTE — ED PROVIDER NOTES
"  History     Chief Complaint   Patient presents with     Hematuria     Blood in urine- starting today. Recently treated for a UTI     HPI  Reyna Rose is a 21 year old female who presents for dysuria, hematuria, and urinary frequency.  Symptoms started today.  She first noticed this morning and it has only gotten worse throughout the day.  She does have mild nausea.  She has had some mild left flank pain radiating to the left lower quadrant, cramping.  No vaginal bleeding or discharge.  Normal menstrual bleeding was 2 weeks ago.  No unusual discomfort with sexual intercourse.  No new sexual partners.  She denies fever, chills, vomiting, diarrhea, or rash.    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  [facility] and was told that Reyna would grow to only about 5'2\" per Reyna's mom.  0.45% of growth percentile based on stature-for-age.  0.06% of growth percentile based on weight-for-age.  1.92% of growth percentile based on BMI-for-age.           Underweight 12/11/2008     Priority: Medium     April 6, 2009: Andreas Mom is 5'5\" and Reyna's Dad is 5'5\" .  [facility] and was told that Reyna would grow to only about 5'2\" per Reyna's mom.  0.45% of growth percentile based on stature-for-age.  0.06% of growth percentile based on weight-for-age.  1.92% of growth percentile based on BMI-for-age.         Anxiety NOS 12/11/2008     Priority: Medium     April 6, 2009: Reyna has been followed by psychologist Delia " in [facility]. Reyna has completed her therapy. Reyna is doing well at school and Reyna's mom reports that Delia felt that no further follow up is needed at this time.          Past Medical History:    No past medical history on file.    Past Surgical History:    No past surgical history on file.    Family History:    Family History   Problem Relation Age of Onset     Gastrointestinal Disease Mother         GERD     Lipids Maternal Grandmother      Allergies Maternal Grandmother         many med allergies     Heart Disease Maternal Grandfather      Heart Disease Paternal Grandmother         MI at 55yo       Social History:  Marital Status:  Single [1]  Social History     Tobacco Use     Smoking status: Never Smoker     Smokeless tobacco: Not on file   Substance Use Topics     Alcohol use: No     Drug use: No        Medications:    cephALEXin (KEFLEX) 250 MG/5ML suspension  [START ON 3/2/2022] cephALEXin (KEFLEX) 250 MG/5ML suspension  ondansetron (ZOFRAN-ODT) 4 MG ODT tab  ciprofloxacin (CIPRO) 500 MG tablet  ENSKYCE 0.15-30 MG-MCG tablet  omeprazole (PRILOSEC) 10 MG DR capsule          Review of Systems  Pertinent positives and negatives listed in the HPI, all other systems reviewed and are negative.    Physical Exam   BP: 123/84  Pulse: 69  Temp: 98.4  F (36.9  C)  Resp: 16  Weight: 40.8 kg (90 lb)  SpO2: 100 %      Physical Exam  Vitals and nursing note reviewed.   Constitutional:       General: She is in acute distress.      Appearance: She is well-developed. She is not diaphoretic.   HENT:      Head: Normocephalic and atraumatic.      Right Ear: External ear normal.      Left Ear: External ear normal.      Nose: Nose normal.   Eyes:      General: No scleral icterus.     Conjunctiva/sclera: Conjunctivae normal.   Cardiovascular:      Rate and Rhythm: Normal rate and regular rhythm.   Pulmonary:      Effort: Pulmonary effort is normal. No respiratory distress.      Breath sounds: No stridor.   Abdominal:       General: There is no distension.      Palpations: Abdomen is soft.      Tenderness: There is left CVA tenderness. There is no right CVA tenderness or guarding.   Musculoskeletal:      Cervical back: Normal range of motion.   Skin:     General: Skin is warm and dry.   Neurological:      Mental Status: She is alert and oriented to person, place, and time.   Psychiatric:         Behavior: Behavior normal.         ED Course                 Procedures              Critical Care time:  none               Results for orders placed or performed during the hospital encounter of 02/27/22 (from the past 24 hour(s))   UA with Microscopic   Result Value Ref Range    Color Urine Yellow Colorless, Straw, Light Yellow, Yellow    Appearance Urine Slightly Cloudy (A) Clear    Glucose Urine Negative Negative mg/dL    Bilirubin Urine Negative Negative    Ketones Urine Negative Negative mg/dL    Specific Gravity Urine 1.030 1.003 - 1.035    Blood Urine Large (A) Negative    pH Urine 6.5 5.0 - 7.0    Protein Albumin Urine Negative Negative mg/dL    Urobilinogen Urine Normal Normal, 2.0 mg/dL    Nitrite Urine Negative Negative    Leukocyte Esterase Urine Moderate (A) Negative    Bacteria Urine Few (A) None Seen /HPF    Mucus Urine Present (A) None Seen /LPF    RBC Urine 75 (H) <=2 /HPF    WBC Urine 20 (H) <=5 /HPF    Squamous Epithelials Urine 2 (H) <=1 /HPF   HCG qualitative urine   Result Value Ref Range    hCG Urine Qualitative Negative Negative       Medications - No data to display    Assessments & Plan (with Medical Decision Making)   21-year-old female who presents for urinary frequency, dysuria, and hematuria.  Blood pressure is 123/84, temperature is 36.9  C, SPO2 is 100% on room air.  Urine pregnancy test is negative.  Urinalysis is positive for red blood cells, white blood cells, and leukocyte esterase.  Urine culture is pending.  Chlamydia and gonorrhea testing is pending.  The patient is comfortable here well-appearing, and  symptoms are likely related to acute pyelonephritis given her left flank pain.  We will start her on a course of cephalexin and she is given a short course of ondansetron for nausea.  She is told to return if she has worsening of her symptoms or other concerns, otherwise follow-up in clinic.  The patient is in agreement with this plan.    I have reviewed the nursing notes.    I have reviewed the findings, diagnosis, plan and need for follow up with the patient.       Discharge Medication List as of 2/27/2022 10:07 PM      START taking these medications    Details   !! cephALEXin (KEFLEX) 250 MG/5ML suspension Take 10 mLs (500 mg) by mouth 3 times daily, Disp-100 mL, R-0, [facility]      !! cephALEXin (KEFLEX) 250 MG/5ML suspension Take 13.6 mLs (679 mg) by mouth 3 times daily for 7 days, Disp-285.6 mL, R-0, E-Prescribe      ondansetron (ZOFRAN-ODT) 4 MG ODT tab Take 1 tablet (4 mg) by mouth every 8 hours as needed for nausea, Disp-10 tablet, R-0, [facility]       !! - Potential duplicate medications found. Please discuss with provider.          Final diagnoses:   Acute pyelonephritis       2/27/2022   [facility] EMERGENCY DEPT     Lucas Baltazar MD  02/27/22 [id number]

## 2022-02-28 NOTE — DISCHARGE INSTRUCTIONS
Use ondansetron as needed for nausea.  Use acetaminophen and ibuprofen for pain.  Take the antibiotics as prescribed.  3 times a day for a total of 10 days.  Return to the emergency department for repeated vomiting, worsening symptoms, lightheadedness, or other concerns.  Follow-up in clinic if not feeling better over the next 3 to 4 days.

## 2022-03-02 LAB — BACTERIA UR CULT: ABNORMAL

## 2022-03-05 ENCOUNTER — TELEPHONE (OUTPATIENT)
Dept: EMERGENCY MEDICINE | Facility: CLINIC | Age: 22
End: 2022-03-05
Payer: COMMERCIAL

## 2022-03-05 NOTE — TELEPHONE ENCOUNTER
"[facility]     Patient/parent Name  Reyna    DENISE Assessment (Patient s current Symptoms), include time called.    5:01P - reviewed lab results - patient reports  Urinary symptoms:  No  Fever:  NO  Overall:  Improving \"everything seems to be cleaning up\"    Lab result (if applicable):  Final Urine Culture Report on 3/2/22  [facility] Emergency Dept discharge antibiotic prescribed: cephALEXin (KEFLEX) 250 MG/5ML suspension, Take 13.6 mLs (679 mg) by mouth 3 times daily for 7 days  #1. Bacteria, 10,000-50,000 CFU/mL Escherichia coli, is SUSCEPTIBLE to Antibiotic.    No change in treatment per [facility] ED lab result Urine Culture protocol.      RN Recommendations/Instructions per [place] ED lab result protocol  Patient notified of lab result and treatment recommendations.  Advised to continue and complete antibiotic, OK to support gut health with probiotic/yogurt, follow up with PCP for any returning/worsening symptoms.     Please Contact your PCP clinic or return to the Emergency department if your:    Symptoms return.    Symptoms do not improve after 3 days on antibiotic.    Symptoms do not resolve after completing antibiotic.    Symptoms worsen or other concerning symptom's.    PCP follow-up Questions asked: YES       Farrah Marcus RN  [email or web address] [place]  Emergency Dept Lab Result RN  Ph# [phone]     Copy of Lab result   Contains abnormal data Urine Culture  Order: [id number]   Status: Final result     Visible to patient: No (inaccessible in MyChart)    Specimen Information: Urine, Clean Catch         3 Result Notes    Culture 10,000-50,000 CFU/mL Escherichia coli Abnormal             Resulting Agency: [facility]       Susceptibility      Escherichia coli (1)    Antibiotic Interpretation Sensitivity  Method Status    Ampicillin Resistant >=32.0 ug/mL MARCOS Final    Ampicillin/ Sulbactam Resistant >=32.0 ug/mL " MARCOS Final    Piperacillin/Tazobactam Susceptible <=4.0 ug/mL MARCOS Final    Cefazolin Susceptible <=4.0 ug/mL MARCOS Final     Cefazolin MARCOS breakpoints are for the treatment of uncomplicated urinary tract infections. For the treatment of systemic infections, please contact the laboratory for additional testing.       Cefoxitin Susceptible <=4.0 ug/mL MARCOS Final    Ceftazidime Susceptible <=1.0 ug/mL MARCOS Final    Ceftriaxone Susceptible <=1.0 ug/mL MARCOS Final    Cefepime Susceptible <=1.0 ug/mL MARCOS Final    Gentamicin Susceptible <=1.0 ug/mL MARCOS Final    Tobramycin Susceptible <=1.0 ug/mL MARCOS Final    Ciprofloxacin Susceptible <=0.25 ug/mL MARCOS Final    Levofloxacin Susceptible <=0.12 ug/mL MARCOS Final    Nitrofurantoin Susceptible <=16.0 ug/mL MARCOS Final    Trimethoprim/Sulfamethoxazole Susceptible <=1/19 ug/mL MARCOS Final          Specimen Collected: 02/27/22  9:26 PM Last Resulted: 03/02/22  2:52 PM

## 2023-09-09 ENCOUNTER — HOSPITAL ENCOUNTER (EMERGENCY)
Facility: CLINIC | Age: 23
Discharge: HOME | End: 2023-09-09
Attending: PHYSICIAN ASSISTANT | Admitting: PHYSICIAN ASSISTANT
Payer: COMMERCIAL

## 2023-09-09 VITALS
OXYGEN SATURATION: 100 % | HEART RATE: 114 BPM | SYSTOLIC BLOOD PRESSURE: 127 MMHG | DIASTOLIC BLOOD PRESSURE: 79 MMHG | TEMPERATURE: 97.8 F

## 2023-09-09 DIAGNOSIS — J02.0 STREP THROAT: ICD-10-CM

## 2023-09-09 LAB — GROUP A STREP BY PCR: DETECTED

## 2023-09-09 PROCEDURE — 87651 STREP A DNA AMP PROBE: CPT | Performed by: PHYSICIAN ASSISTANT

## 2023-09-09 PROCEDURE — 99213 OFFICE O/P EST LOW 20 MIN: CPT | Performed by: PHYSICIAN ASSISTANT

## 2023-09-09 PROCEDURE — G0463 HOSPITAL OUTPT CLINIC VISIT: HCPCS | Performed by: PHYSICIAN ASSISTANT

## 2023-09-09 RX ORDER — PENICILLIN V POTASSIUM 250 MG/5ML
500 SOLUTION, RECONSTITUTED, ORAL ORAL 2 TIMES DAILY
Qty: 200 ML | Refills: 0 | Status: SHIPPED | OUTPATIENT
Start: 2023-09-09 | End: 2023-09-19

## 2023-09-09 RX ORDER — PENICILLIN V POTASSIUM 500 MG/1
500 TABLET, FILM COATED ORAL 2 TIMES DAILY
Qty: 20 TABLET | Refills: 0 | Status: SHIPPED | OUTPATIENT
Start: 2023-09-09 | End: 2023-09-09

## 2023-09-09 NOTE — ED PROVIDER NOTES
"  History     Chief Complaint   Patient presents with    Pharyngitis     Fever for few days that just broke, but still not feeling well. Sore throat, swollen glands, and having a hard time swallowing.      HPI  Reyna Rose is a 23 year old female who presents to urgent care with concern over sore throat symptoms for last 3 days.  Patient additionally ports fever up to 101.8 with associated chills, myalgias at onset which has since resolved.  She continues to have throat discomfort, swollen glands, nausea, loose stools and states minimal cough secondary to throat irritation.  She has not had any wheezing, abdominal pain, dyspnea, wheezing.  She has multiple contacts with work with URI symptoms.  She has not treat with ibuprofen last dose    Allergies:  No Known Allergies    Problem List:    Patient Active Problem List    Diagnosis Date Noted    Pubertal delay 12/15/2011     Priority: Medium    Arrest of bone development or growth 12/15/2011     Priority: Medium    Family history of type 1 neurofibromatosis 12/15/2011     Priority: Medium    Short stature 04/06/2009     Priority: Medium     April 6, 2009: Reyna's Mom is 5'5\" and Reyna's Dad is 5'5\" .  [facility] and was told that Reyna would grow to only about 5'2\" per Reyna's mom.  0.45% of growth percentile based on stature-for-age.  0.06% of growth percentile based on weight-for-age.  1.92% of growth percentile based on BMI-for-age.          Underweight 12/11/2008     Priority: Medium     April 6, 2009: Reyna's Mom is 5'5\" and Reyna's Dad is 5'5\" .  [facility] and was told that Reyna would grow to only about 5'2\" per Reyna's mom.  0.45% of growth percentile based on stature-for-age.  0.06% of growth percentile based on weight-for-age.  1.92% of growth percentile based on BMI-for-age.        Anxiety NOS 12/11/2008     Priority: " Medium     April 6, 2009: Reyna has been followed by psychologist Delia in [facility]. Reyna has completed her therapy. Reyna is doing well at school and Reyna's mom reports that Delia felt that no further follow up is needed at this time.        Past Medical History:    History reviewed. No pertinent past medical history.    Past Surgical History:    History reviewed. No pertinent surgical history.    Family History:    Family History   Problem Relation Age of Onset    Gastrointestinal Disease Mother         GERD    Lipids Maternal Grandmother     Allergies Maternal Grandmother         many med allergies    Heart Disease Maternal Grandfather     Heart Disease Paternal Grandmother         MI at 55yo     Social History:  Marital Status:  Single [1]  Social History     Tobacco Use    Smoking status: Never    Smokeless tobacco: Never   Substance Use Topics    Alcohol use: No    Drug use: No        Medications:    cephALEXin (KEFLEX) 250 MG/5ML suspension  ciprofloxacin (CIPRO) 500 MG tablet  ENSKYCE 0.15-30 MG-MCG tablet  omeprazole (PRILOSEC) 10 MG DR capsule  ondansetron (ZOFRAN-ODT) 4 MG ODT tab      Review of Systems  CONSTITUTIONAL:POSITIVE  for fever, chills, myalgias   INTEGUMENTARY/SKIN: NEGATIVE for worrisome rashes, moles or lesions  EYES: NEGATIVE for vision changes or irritation  ENT/MOUTH: POSITIVE for sore throat and NEGATIVE for nasal congestion, ear pain   RESP:NEGATIVE for significant cough or SOB  GI: POSITIVE for nausea, frequent stools NEGATIVE For vomiting, abdominal pain   Physical Exam   BP: 127/79  Pulse: 114  Temp: 97.8  F (36.6  C)  SpO2: 100 %  Physical Exam  GENERAL APPEARANCE: healthy, alert and no distress  EYES: EOMI,  PERRL, conjunctiva clear  HENT: ear canals and TM's normal.  Pharyngeal erythema, tonsillar swelling, soft palate petechia  NECK: supple, nontender, bilateral tonsillar lymphadenopathy   RESP: lungs clear to auscultation - no rales, rhonchi or wheezes  CV: tachycardia,  regular rhythm, normal S1 S2, no murmur noted  SKIN: no suspicious lesions or rashes  ED Course           Procedures       Critical Care time:  none            Results for orders placed or performed during the hospital encounter of 09/09/23 (from the past 24 hour(s))   Group A Streptococcus PCR Throat Swab    Specimen: Throat; Swab   Result Value Ref Range    Group A strep by PCR Detected (A) Not Detected    Narrative    The Xpert Xpress Strep A test, performed on the [employer], is a rapid, qualitative in vitro diagnostic test for the detection of Streptococcus pyogenes (Group A ß-hemolytic Streptococcus, Strep A) in throat swab specimens from patients with signs and symptoms of pharyngitis. The Xpert Xpress Strep A test can be used as an aid in the diagnosis of Group A Streptococcal pharyngitis. The assay is not intended to monitor treatment for Group A Streptococcus infections. The Xpert Xpress Strep A test utilizes an automated real-time polymerase chain reaction (PCR) to detect Streptococcus pyogenes DNA.     Medications - No data to display    Assessments & Plan (with Medical Decision Making)     I have reviewed the nursing notes.  I have reviewed the findings, diagnosis, plan and need for follow up with the patient.     New Prescriptions    PENICILLIN V (VEETID) 250 MG/5 ML SUSPENSION    Take 10 mLs (500 mg) by mouth 2 times daily for 10 days     Final diagnoses:   Strep throat     RST positive.  Patient will be initiated on antibiotics.  Patient and family notified contagious for 24 hours after initiating antibiotics.  Follow up with PCP if no improvement in three days.  Worrisome reasons to seek care sooner discussed.      Disclaimer: This note consists of symbols derived from keyboarding, dictation, and/or voice recognition software. As a result, there may be errors in the script that have gone undetected.  Please consider this when interpreting information found in the  chart.    9/9/2023   [facility] EMERGENCY DEPT       Kelly Rosen PA-C  09/09/23 [id number]

## 2024-02-19 ENCOUNTER — HOSPITAL ENCOUNTER (EMERGENCY)
Facility: CLINIC | Age: 24
Discharge: HOME | End: 2024-02-19
Attending: PHYSICIAN ASSISTANT | Admitting: PHYSICIAN ASSISTANT
Payer: COMMERCIAL

## 2024-02-19 VITALS
RESPIRATION RATE: 20 BRPM | SYSTOLIC BLOOD PRESSURE: 119 MMHG | DIASTOLIC BLOOD PRESSURE: 80 MMHG | TEMPERATURE: 98.1 F | HEART RATE: 85 BPM | OXYGEN SATURATION: 100 %

## 2024-02-19 DIAGNOSIS — R39.9 LOWER URINARY TRACT SYMPTOMS: ICD-10-CM

## 2024-02-19 DIAGNOSIS — M54.50 ACUTE LEFT-SIDED LOW BACK PAIN WITHOUT SCIATICA: ICD-10-CM

## 2024-02-19 LAB
ALBUMIN UR-MCNC: NEGATIVE MG/DL
APPEARANCE UR: ABNORMAL
BILIRUB UR QL STRIP: NEGATIVE
COLOR UR AUTO: YELLOW
GLUCOSE UR STRIP-MCNC: NEGATIVE MG/DL
HGB UR QL STRIP: ABNORMAL
KETONES UR STRIP-MCNC: NEGATIVE MG/DL
LEUKOCYTE ESTERASE UR QL STRIP: NEGATIVE
MUCOUS THREADS #/AREA URNS LPF: PRESENT /LPF
NITRATE UR QL: NEGATIVE
PH UR STRIP: 5 [PH] (ref 5–7)
RBC URINE: 3 /HPF
SP GR UR STRIP: 1.03 (ref 1–1.03)
SQUAMOUS EPITHELIAL: 3 /HPF
UROBILINOGEN UR STRIP-MCNC: 2 MG/DL
WBC URINE: 0 /HPF

## 2024-02-19 PROCEDURE — 99213 OFFICE O/P EST LOW 20 MIN: CPT | Performed by: PHYSICIAN ASSISTANT

## 2024-02-19 PROCEDURE — 81001 URINALYSIS AUTO W/SCOPE: CPT | Performed by: PHYSICIAN ASSISTANT

## 2024-02-19 PROCEDURE — 87086 URINE CULTURE/COLONY COUNT: CPT | Performed by: PHYSICIAN ASSISTANT

## 2024-02-19 PROCEDURE — G0463 HOSPITAL OUTPT CLINIC VISIT: HCPCS | Performed by: PHYSICIAN ASSISTANT

## 2024-02-19 ASSESSMENT — ENCOUNTER SYMPTOMS
BACK PAIN: 1
CONSTITUTIONAL NEGATIVE: 1
FEVER: 0
DYSURIA: 1
FREQUENCY: 1

## 2024-02-20 NOTE — ED PROVIDER NOTES
"  History   No chief complaint on file.    HPI  Reyna Rose is a 23 year old female who presents to Urgent Care with complaints of dysuria and increased urinary urgency and frequency since yesterday.  Patient states symptoms seem to improve since yesterday after drinking a lot of cranberry juice however they worsened again today.  She states she has also been seeing a chiropractor for left lower back pain.  Denies history of kidney stones.  She has also noticed some suprapubic abdominal cramping but she states she is also ovulating.  Reports a history of UTIs.  She states she does not have a concern for STDs as she has not been sexually active since she had a physical in which she tested negative for STDs at the end of last month.  Denies fevers, chills, nausea, vomiting, or diarrhea.      Allergies:  No Known Allergies    Problem List:    Patient Active Problem List    Diagnosis Date Noted    Pubertal delay 12/15/2011     Priority: Medium    Arrest of bone development or growth 12/15/2011     Priority: Medium    Family history of type 1 neurofibromatosis 12/15/2011     Priority: Medium    Short stature 04/06/2009     Priority: Medium     April 6, 2009: Reyna's Mom is 5'5\" and Reyna's Dad is 5'5\" .  [facility] [place] and was told that Reyna would grow to only about 5'2\" per Reyna's mom.  0.45% of growth percentile based on stature-for-age.  0.06% of growth percentile based on weight-for-age.  1.92% of growth percentile based on BMI-for-age.          Underweight 12/11/2008     Priority: Medium     April 6, 2009: Reyna's Mom is 5'5\" and Reyna's Dad is 5'5\" .  [facility] [place] [facility] and was told that Reyna would grow to only about 5'2\" per Reyna's mom.  0.45% of growth percentile based on stature-for-age.  0.06% of growth percentile based on weight-for-age.  1.92% of growth percentile based on " BMI-for-age.        Anxiety NOS 12/11/2008     Priority: Medium     April 6, 2009: Reyna has been followed by psychologist Delia in [facility]. Reyna has completed her therapy. Reyna is doing well at school and Reyna's mom reports that Delia felt that no further follow up is needed at this time.          Past Medical History:    No past medical history on file.    Past Surgical History:    No past surgical history on file.    Family History:    Family History   Problem Relation Age of Onset    Gastrointestinal Disease Mother         GERD    Lipids Maternal Grandmother     Allergies Maternal Grandmother         many med allergies    Heart Disease Maternal Grandfather     Heart Disease Paternal Grandmother         MI at 53yo       Social History:  Marital Status:  Single [1]  Social History     Tobacco Use    Smoking status: Never    Smokeless tobacco: Never   Substance Use Topics    Alcohol use: No    Drug use: No        Medications:    cephALEXin (KEFLEX) 250 MG/5ML suspension  ciprofloxacin (CIPRO) 500 MG tablet  ENSKYCE 0.15-30 MG-MCG tablet  omeprazole (PRILOSEC) 10 MG DR capsule  ondansetron (ZOFRAN-ODT) 4 MG ODT tab          Review of Systems   Constitutional: Negative.  Negative for fever.   Genitourinary:  Positive for dysuria, frequency and urgency.   Musculoskeletal:  Positive for back pain.   All other systems reviewed and are negative.      Physical Exam   BP: 119/80  Pulse: 85  Temp: 98.1  F (36.7  C)  Resp: 20  SpO2: 100 %      Physical Exam  Constitutional:       General: She is not in acute distress.     Appearance: Normal appearance. She is not ill-appearing, toxic-appearing or diaphoretic.   HENT:      Head: Normocephalic and atraumatic.   Eyes:      Conjunctiva/sclera: Conjunctivae normal.   Cardiovascular:      Rate and Rhythm: Normal rate and regular rhythm.      Heart sounds: Normal heart sounds.   Pulmonary:      Effort: Pulmonary effort is normal.      Breath sounds: Normal breath sounds.    Abdominal:      General: There is no distension.      Palpations: Abdomen is soft.      Tenderness: There is no abdominal tenderness. There is no right CVA tenderness, left CVA tenderness, guarding or rebound.   Musculoskeletal:         General: Normal range of motion.      Cervical back: Neck supple.      Comments: Left-sided lumbar paraspinal muscle tenderness to palpation.  No midline tenderness.   Skin:     General: Skin is warm and dry.   Neurological:      General: No focal deficit present.      Mental Status: She is alert.         ED Course                 Procedures      Results for orders placed or performed during the hospital encounter of 02/19/24 (from the past 24 hour(s))   UA with Microscopic reflex to Culture    Specimen: Urine, Clean Catch   Result Value Ref Range    Color Urine Yellow Colorless, Straw, Light Yellow, Yellow    Appearance Urine Slightly Cloudy (A) Clear    Glucose Urine Negative Negative mg/dL    Bilirubin Urine Negative Negative    Ketones Urine Negative Negative mg/dL    Specific Gravity Urine 1.031 1.003 - 1.035    Blood Urine Small (A) Negative    pH Urine 5.0 5.0 - 7.0    Protein Albumin Urine Negative Negative mg/dL    Urobilinogen Urine 2.0 Normal, 2.0 mg/dL    Nitrite Urine Negative Negative    Leukocyte Esterase Urine Negative Negative    Mucus Urine Present (A) None Seen /LPF    RBC Urine 3 (H) <=2 /HPF    WBC Urine 0 <=5 /HPF    Squamous Epithelials Urine 3 (H) <=1 /HPF    Narrative    Urine Culture not indicated       Medications - No data to display    Assessments & Plan (with Medical Decision Making)     Pt is a 23 year old female who presents to Urgent Care with complaints of dysuria and increased urinary urgency and frequency since yesterday.  Patient states symptoms seem to improve since yesterday after drinking a lot of cranberry juice however they worsened again today.  She states she has also been seeing a chiropractor for left lower back pain.  Denies history of  kidney stones.  She has also noticed some suprapubic abdominal cramping but she states she is also ovulating.  Reports a history of UTIs.  She states she does not have a concern for STDs as she has not been sexually active since she had a physical in which she tested negative for STDs at the end of last month.  No fevers.    Pt is afebrile on arrival.  Exam as above.  Urinalysis was negative for evidence of infection.  3 RBCs noted.  Urine was sent for culture.  Discussed results with patient.  Discussed possibility of kidney stone given associated left-sided low back pain and small amount of microscopic hematuria noted.  Patient is in no distress.  Encouraged continued symptomatic treatments at home and will defer CT imaging at this time.  Recommend close monitoring of symptoms and she was instructed to return the emergency department should she develop any worsening pain, vomiting, fevers, or any other symptoms of concern.  Patient expresses understanding of this and agreement with the plan.  Will await urine culture results before treating with antibiotics.  Encouraged symptomatic treatments at home.  Return precautions were reviewed.  Hand-outs were provided.    Patient was instructed to follow-up with PCP for continued care and management.  She is to return to the ED for persistent and/or worsening symptoms.  Patient expressed understanding of the diagnosis and plan and was discharged home in good condition.    I have reviewed the nursing notes.    I have reviewed the findings, diagnosis, plan and need for follow up with the patient.      Discharge Medication List as of 2/19/2024  6:56 PM          Final diagnoses:   Lower urinary tract symptoms   Acute left-sided low back pain without sciatica       2/19/2024   [facility] EMERGENCY DEPT      Disclaimer:  This note consists of symbols derived from keyboarding, dictation and/or voice recognition software.  As a result, there may be errors in the  script that have gone undetected.  Please consider this when interpreting information found in this chart.     Rosalina Saha PA-C  02/19/24 [id number]

## 2024-02-21 LAB — BACTERIA UR CULT: NORMAL

## 2024-02-29 ENCOUNTER — HOSPITAL ENCOUNTER (EMERGENCY)
Facility: CLINIC | Age: 24
Discharge: HOME | End: 2024-02-29
Attending: EMERGENCY MEDICINE | Admitting: EMERGENCY MEDICINE
Payer: COMMERCIAL

## 2024-02-29 VITALS
DIASTOLIC BLOOD PRESSURE: 64 MMHG | HEIGHT: 55 IN | OXYGEN SATURATION: 100 % | RESPIRATION RATE: 16 BRPM | WEIGHT: 90 LBS | SYSTOLIC BLOOD PRESSURE: 100 MMHG | BODY MASS INDEX: 20.83 KG/M2 | HEART RATE: 79 BPM | TEMPERATURE: 98.7 F

## 2024-02-29 DIAGNOSIS — M54.42 ACUTE LEFT-SIDED LOW BACK PAIN WITH LEFT-SIDED SCIATICA: ICD-10-CM

## 2024-02-29 LAB
ALBUMIN UR-MCNC: NEGATIVE MG/DL
APPEARANCE UR: CLEAR
BILIRUB UR QL STRIP: NEGATIVE
COLOR UR AUTO: YELLOW
GLUCOSE UR STRIP-MCNC: NEGATIVE MG/DL
HGB UR QL STRIP: NEGATIVE
KETONES UR STRIP-MCNC: NEGATIVE MG/DL
LEUKOCYTE ESTERASE UR QL STRIP: NEGATIVE
MUCOUS THREADS #/AREA URNS LPF: PRESENT /LPF
NITRATE UR QL: NEGATIVE
PH UR STRIP: 5 [PH] (ref 5–7)
RBC URINE: 1 /HPF
SP GR UR STRIP: 1.03 (ref 1–1.03)
SQUAMOUS EPITHELIAL: 1 /HPF
UROBILINOGEN UR STRIP-MCNC: 4 MG/DL
WBC URINE: 3 /HPF

## 2024-02-29 PROCEDURE — 99283 EMERGENCY DEPT VISIT LOW MDM: CPT

## 2024-02-29 PROCEDURE — 81001 URINALYSIS AUTO W/SCOPE: CPT | Performed by: EMERGENCY MEDICINE

## 2024-02-29 PROCEDURE — 99283 EMERGENCY DEPT VISIT LOW MDM: CPT | Performed by: EMERGENCY MEDICINE

## 2024-02-29 PROCEDURE — 250N000013 HC RX MED GY IP 250 OP 250 PS 637: Performed by: EMERGENCY MEDICINE

## 2024-02-29 RX ORDER — IBUPROFEN 100 MG/1
10 TABLET, CHEWABLE ORAL EVERY 8 HOURS PRN
COMMUNITY
Start: 2024-02-29 | End: 2024-03-05

## 2024-02-29 RX ORDER — IBUPROFEN 100 MG/5ML
10 SUSPENSION, ORAL (FINAL DOSE FORM) ORAL ONCE
Status: COMPLETED | OUTPATIENT
Start: 2024-02-29 | End: 2024-02-29

## 2024-02-29 RX ORDER — METHOCARBAMOL 500 MG/1
500 TABLET, FILM COATED ORAL 4 TIMES DAILY PRN
Qty: 30 TABLET | Refills: 0 | Status: SHIPPED | OUTPATIENT
Start: 2024-02-29

## 2024-02-29 RX ORDER — METHOCARBAMOL 500 MG/1
500 TABLET, FILM COATED ORAL ONCE
Status: COMPLETED | OUTPATIENT
Start: 2024-02-29 | End: 2024-02-29

## 2024-02-29 RX ADMIN — IBUPROFEN 400 MG: 100 SUSPENSION ORAL at 03:09

## 2024-02-29 RX ADMIN — METHOCARBAMOL 500 MG: 500 TABLET ORAL at 03:22

## 2024-02-29 ASSESSMENT — ENCOUNTER SYMPTOMS
WOUND: 0
WEAKNESS: 0
COUGH: 0
NECK STIFFNESS: 0
FEVER: 0
DIARRHEA: 0
APPETITE CHANGE: 0
NECK PAIN: 0
ABDOMINAL PAIN: 0
CHILLS: 0
VOMITING: 0
FATIGUE: 0
NUMBNESS: 0
CHEST TIGHTNESS: 0
MYALGIAS: 0
SHORTNESS OF BREATH: 0
BACK PAIN: 1
NAUSEA: 0

## 2024-02-29 ASSESSMENT — COLUMBIA-SUICIDE SEVERITY RATING SCALE - C-SSRS
6. HAVE YOU EVER DONE ANYTHING, STARTED TO DO ANYTHING, OR PREPARED TO DO ANYTHING TO END YOUR LIFE?: NO
1. IN THE PAST MONTH, HAVE YOU WISHED YOU WERE DEAD OR WISHED YOU COULD GO TO SLEEP AND NOT WAKE UP?: NO
2. HAVE YOU ACTUALLY HAD ANY THOUGHTS OF KILLING YOURSELF IN THE PAST MONTH?: NO

## 2024-02-29 ASSESSMENT — ACTIVITIES OF DAILY LIVING (ADL)
ADLS_ACUITY_SCORE: 35

## 2024-02-29 NOTE — ED TRIAGE NOTES
Pt states she has lower back pain that radiates down the left leg, saw chiropractor on Monday for sciatica, no relief, no otc meds taken.     Triage Assessment (Adult)       Row Name 02/29/24 0201          Triage Assessment    Airway WDL WDL        Respiratory WDL    Respiratory WDL WDL        Skin Circulation/Temperature WDL    Skin Circulation/Temperature WDL WDL        Cardiac WDL    Cardiac WDL WDL        Peripheral/Neurovascular WDL    Peripheral Neurovascular WDL WDL        Cognitive/Neuro/Behavioral WDL    Cognitive/Neuro/Behavioral WDL WDL

## 2024-02-29 NOTE — ED PROVIDER NOTES
"  History   No chief complaint on file.    HPI  Reyna Rose is a 23 year old female with a history of being underweight presenting for evaluation of of left lower back pain.  Patient has been having back issues on and off since late December or early January.  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  She woke up 1 day with significant pain in her low back with some radiation down her left leg.  She saw chiropractor and seem to get better after a few visits but has had recurrent symptoms.  Yesterday during the day was feeling relatively well but yesterday evening developed recurrent pain which was severe prompting evaluation in the ED.  She reports severe left lower back pain which is worse with movement including bending or twisting.  Pain radiates down to her left leg to her mid shin area.  Pain is sharp and severe with movement but eases up with rest.  Denies any numbness or weakness.  Has had a few instances of bladder incontinence of unclear cause.  Denies fevers or chills.  No trauma.  Denies previous back pain issues.    Allergies:  No Known Allergies    Problem List:    Patient Active Problem List    Diagnosis Date Noted    Pubertal delay 12/15/2011     Priority: Medium    Arrest of bone development or growth 12/15/2011     Priority: Medium    Family history of type 1 neurofibromatosis 12/15/2011     Priority: Medium    Short stature 04/06/2009     Priority: Medium     April 6, 2009: Nora Mom is 5'5\" and Reyna's Dad is 5'5\" .  At Reyna's previous clinic, [facility] in [place], Reyna had a bone scan and was told that Reyna would grow to only about 5'2\" per Reyna's mom.  0.45% of growth percentile based on stature-for-age.  0.06% of growth percentile based on weight-for-age.  1.92% of growth percentile based on BMI-for-age.          Underweight 12/11/2008     Priority: Medium     April 6, 2009: Nora Mom is 5'5\" and Reyna's Dad is 5'5\" .  [facility] [place] and was told that Reyna would grow to only about 5'2\" per Reyna's mom.  0.45% of growth percentile based on stature-for-age.  0.06% of growth percentile based on weight-for-age.  1.92% of growth percentile based on BMI-for-age.        Anxiety NOS 12/11/2008     Priority: Medium     April 6, 2009: Reyna has been followed by psychologist Delia in [facility]. Reyna has completed her therapy. Reyna is doing well at school and Reyna's mom reports that Delia felt that no further follow up is needed at this time.          Past Medical History:    No past medical history on file.    Past Surgical History:    No past surgical history on file.    Family History:    Family History   Problem Relation Age of Onset    Gastrointestinal Disease Mother         GERD    Lipids Maternal Grandmother     Allergies Maternal Grandmother         many med allergies    Heart Disease Maternal Grandfather     Heart Disease Paternal Grandmother         MI at 53yo       Social History:  Marital Status:  Single [1]  Social History     Tobacco Use    Smoking status: Never    Smokeless tobacco: Never   Substance Use Topics    Alcohol use: No    Drug use: No        Medications:    ibuprofen (ADVIL/MOTRIN) 100 MG chewable tablet  methocarbamol (ROBAXIN) 500 MG tablet  omeprazole (PRILOSEC) 10 MG DR capsule          Review of Systems   Constitutional:  Negative for appetite change, chills, fatigue and fever.   HENT:  Negative for congestion.    Respiratory:  Negative for cough, chest tightness and shortness of breath.    Cardiovascular:  Negative for chest pain.   Gastrointestinal:  Negative for abdominal pain, diarrhea, nausea and vomiting.   Musculoskeletal:  Positive for back pain (Left lower back). Negative for myalgias, neck pain and neck stiffness.   Skin:  Negative for wound.   Neurological:  Negative for weakness and numbness.   All other systems reviewed and are " "negative.      Physical Exam   BP: 139/80  Pulse: 90  Temp: 98.7  F (37.1  C)  Resp: 18  Height: (!) 5 cm (1.97\")  Weight: 40.8 kg (90 lb)  SpO2: 100 %      Physical Exam  Vitals and nursing note reviewed.   Constitutional:       Appearance: Normal appearance. She is not ill-appearing or diaphoretic.      Comments: Awake and alert, appears underweight but nontoxic and not in distress   HENT:      Nose: Nose normal.   Eyes:      Conjunctiva/sclera: Conjunctivae normal.   Cardiovascular:      Pulses: Normal pulses.   Pulmonary:      Effort: Pulmonary effort is normal.   Abdominal:      Palpations: Abdomen is soft.      Tenderness: There is no abdominal tenderness.   Musculoskeletal:      Lumbar back: Spasms and tenderness present. No edema or signs of trauma. Positive left straight leg raise test.        Back:    Skin:     General: Skin is warm and dry.      Capillary Refill: Capillary refill takes less than 2 seconds.   Neurological:      Mental Status: She is alert and oriented to person, place, and time.   Psychiatric:         Mood and Affect: Mood normal.         ED Course        Procedures                  Results for orders placed or performed during the hospital encounter of 02/29/24 (from the past 24 hour(s))   UA with Microscopic reflex to Culture    Specimen: Urine, Clean Catch   Result Value Ref Range    Color Urine Yellow Colorless, Straw, Light Yellow, Yellow    Appearance Urine Clear Clear    Glucose Urine Negative Negative mg/dL    Bilirubin Urine Negative Negative    Ketones Urine Negative Negative mg/dL    Specific Gravity Urine 1.028 1.003 - 1.035    Blood Urine Negative Negative    pH Urine 5.0 5.0 - 7.0    Protein Albumin Urine Negative Negative mg/dL    Urobilinogen Urine 4.0 (A) Normal, 2.0 mg/dL    Nitrite Urine Negative Negative    Leukocyte Esterase Urine Negative Negative    Mucus Urine Present (A) None Seen /LPF    RBC Urine 1 <=2 /HPF    WBC Urine 3 <=5 /HPF    Squamous Epithelials Urine 1 " <=1 /HPF    Narrative    Urine Culture not indicated       Medications   ibuprofen (ADVIL/MOTRIN) suspension 400 mg (400 mg Oral $Given 2/29/24 0307)   methocarbamol (ROBAXIN) tablet 500 mg (500 mg Oral $Given 2/29/24 0322)       Assessments & Plan (with Medical Decision Making)  23-year-old female presenting for evaluation of left lower back pain over the past several months intermittently.  Pain is atraumatic but does have pain radiating down her leg suggestive of sciatica.  She is well-appearing in no distress.  Afebrile does have increased pain with movement and palpation of the affected area.  Appears to have muscle spasms and tenderness.  Possible mild disc herniation leading to some sciatica.  Recommended symptomatic treatment with muscle relaxants and ibuprofen/Tylenol.  Also had some mild urinary symptoms and UA was negative for signs of infection.  Counseled on symptomatic treatment with plan for close primary care follow-up for reassessment.  Likely will benefit from physical therapy to optimize recovery and reduce chance of recurrence     I have reviewed the nursing notes.    I have reviewed the findings, diagnosis, plan and need for follow up with the patient.        Discharge Medication List as of 2/29/2024  4:17 AM          Final diagnoses:   Acute left-sided low back pain with left-sided sciatica       2/29/2024   [facility] EMERGENCY DEPT       Murguia, Cirilo Doherty MD  02/29/24 [id number]

## 2024-02-29 NOTE — ED NOTES
Pt states relief from IBU an robaxin, able to ambulate with decreased pain, mother @ bedside, awaiting results, updated on POC, anticipate discharge, call light within reach, will continue to monitor and assist as needed.

## 2024-02-29 NOTE — Clinical Note
Reyna Rose was seen and treated in our emergency department on 2/29/2024.  She may return to work on 03/01/2024.       If you have any questions or concerns, please don't hesitate to call.      Cirilo Murguia MD

## 2024-09-14 ENCOUNTER — HOSPITAL ENCOUNTER (EMERGENCY)
Facility: CLINIC | Age: 24
Discharge: HOME | End: 2024-09-14
Attending: EMERGENCY MEDICINE | Admitting: EMERGENCY MEDICINE
Payer: COMMERCIAL

## 2024-09-14 VITALS
SYSTOLIC BLOOD PRESSURE: 102 MMHG | DIASTOLIC BLOOD PRESSURE: 65 MMHG | OXYGEN SATURATION: 100 % | TEMPERATURE: 98.3 F | HEART RATE: 67 BPM | WEIGHT: 88 LBS | BODY MASS INDEX: 17.28 KG/M2 | RESPIRATION RATE: 16 BRPM | HEIGHT: 60 IN

## 2024-09-14 DIAGNOSIS — N94.6 DYSMENORRHEA: ICD-10-CM

## 2024-09-14 DIAGNOSIS — R10.2 PELVIC CRAMPING: ICD-10-CM

## 2024-09-14 LAB
ABO/RH(D): NORMAL
ALBUMIN UR-MCNC: NEGATIVE MG/DL
ANION GAP SERPL CALCULATED.3IONS-SCNC: 7 MMOL/L (ref 7–15)
ANTIBODY SCREEN: NEGATIVE
APPEARANCE UR: CLEAR
BASOPHILS # BLD AUTO: 0 10E3/UL (ref 0–0.2)
BASOPHILS NFR BLD AUTO: 0 %
BILIRUB UR QL STRIP: NEGATIVE
BUN SERPL-MCNC: 19 MG/DL (ref 6–20)
CALCIUM SERPL-MCNC: 9.1 MG/DL (ref 8.8–10.4)
CHLORIDE SERPL-SCNC: 106 MMOL/L (ref 98–107)
COLOR UR AUTO: YELLOW
CREAT SERPL-MCNC: 0.76 MG/DL (ref 0.51–0.95)
EGFRCR SERPLBLD CKD-EPI 2021: >90 ML/MIN/1.73M2
EOSINOPHIL # BLD AUTO: 0.1 10E3/UL (ref 0–0.7)
EOSINOPHIL NFR BLD AUTO: 1 %
ERYTHROCYTE [DISTWIDTH] IN BLOOD BY AUTOMATED COUNT: 13.3 % (ref 10–15)
GLUCOSE SERPL-MCNC: 105 MG/DL (ref 70–99)
GLUCOSE UR STRIP-MCNC: NEGATIVE MG/DL
HCG UR QL: NEGATIVE
HCO3 SERPL-SCNC: 27 MMOL/L (ref 22–29)
HCT VFR BLD AUTO: 36.8 % (ref 35–47)
HGB BLD-MCNC: 12.3 G/DL (ref 11.7–15.7)
HGB UR QL STRIP: ABNORMAL
HOLD SPECIMEN: NORMAL
HOLD SPECIMEN: NORMAL
IMM GRANULOCYTES # BLD: 0 10E3/UL
IMM GRANULOCYTES NFR BLD: 0 %
KETONES UR STRIP-MCNC: NEGATIVE MG/DL
LEUKOCYTE ESTERASE UR QL STRIP: NEGATIVE
LYMPHOCYTES # BLD AUTO: 1.8 10E3/UL (ref 0.8–5.3)
LYMPHOCYTES NFR BLD AUTO: 21 %
MCH RBC QN AUTO: 31.1 PG (ref 26.5–33)
MCHC RBC AUTO-ENTMCNC: 33.4 G/DL (ref 31.5–36.5)
MCV RBC AUTO: 93 FL (ref 78–100)
MONOCYTES # BLD AUTO: 0.7 10E3/UL (ref 0–1.3)
MONOCYTES NFR BLD AUTO: 8 %
MUCOUS THREADS #/AREA URNS LPF: PRESENT /LPF
NEUTROPHILS # BLD AUTO: 6.2 10E3/UL (ref 1.6–8.3)
NEUTROPHILS NFR BLD AUTO: 70 %
NITRATE UR QL: NEGATIVE
NRBC # BLD AUTO: 0 10E3/UL
NRBC BLD AUTO-RTO: 0 /100
PH UR STRIP: 6 [PH] (ref 5–7)
PLATELET # BLD AUTO: 220 10E3/UL (ref 150–450)
POTASSIUM SERPL-SCNC: 4.2 MMOL/L (ref 3.4–5.3)
RBC # BLD AUTO: 3.96 10E6/UL (ref 3.8–5.2)
RBC URINE: 18 /HPF
SODIUM SERPL-SCNC: 140 MMOL/L (ref 135–145)
SP GR UR STRIP: 1.03 (ref 1–1.03)
SPECIMEN EXPIRATION DATE: NORMAL
SQUAMOUS EPITHELIAL: 2 /HPF
UROBILINOGEN UR STRIP-MCNC: NORMAL MG/DL
WBC # BLD AUTO: 8.8 10E3/UL (ref 4–11)
WBC URINE: 2 /HPF

## 2024-09-14 PROCEDURE — 86900 BLOOD TYPING SEROLOGIC ABO: CPT | Performed by: EMERGENCY MEDICINE

## 2024-09-14 PROCEDURE — 80048 BASIC METABOLIC PNL TOTAL CA: CPT | Performed by: EMERGENCY MEDICINE

## 2024-09-14 PROCEDURE — 85025 COMPLETE CBC W/AUTO DIFF WBC: CPT | Performed by: EMERGENCY MEDICINE

## 2024-09-14 PROCEDURE — 36415 COLL VENOUS BLD VENIPUNCTURE: CPT | Performed by: EMERGENCY MEDICINE

## 2024-09-14 PROCEDURE — 81001 URINALYSIS AUTO W/SCOPE: CPT | Performed by: EMERGENCY MEDICINE

## 2024-09-14 PROCEDURE — 81025 URINE PREGNANCY TEST: CPT | Performed by: EMERGENCY MEDICINE

## 2024-09-14 PROCEDURE — 99283 EMERGENCY DEPT VISIT LOW MDM: CPT | Performed by: EMERGENCY MEDICINE

## 2024-09-14 RX ORDER — IBUPROFEN 400 MG/1
400 TABLET, FILM COATED ORAL ONCE
Status: COMPLETED | OUTPATIENT
Start: 2024-09-14 | End: 2024-09-14

## 2024-09-14 ASSESSMENT — COLUMBIA-SUICIDE SEVERITY RATING SCALE - C-SSRS
2. HAVE YOU ACTUALLY HAD ANY THOUGHTS OF KILLING YOURSELF IN THE PAST MONTH?: NO
6. HAVE YOU EVER DONE ANYTHING, STARTED TO DO ANYTHING, OR PREPARED TO DO ANYTHING TO END YOUR LIFE?: NO
1. IN THE PAST MONTH, HAVE YOU WISHED YOU WERE DEAD OR WISHED YOU COULD GO TO SLEEP AND NOT WAKE UP?: NO

## 2024-09-14 ASSESSMENT — ACTIVITIES OF DAILY LIVING (ADL)
ADLS_ACUITY_SCORE: 33
ADLS_ACUITY_SCORE: 35

## 2024-09-15 NOTE — ED PROVIDER NOTES
History     Chief Complaint   Patient presents with    Pelvic Pain    Vaginal Bleeding     HPI  History per patient, review of [facility] EMR and Care Everywhere EMR.    Reyna Rose is a 24 year old female who is on day 5 of her menstrual cycle with 2 hours of severe pelvic/uterine cramping pain this afternoon which has now essentially resolved.  Menstrual cycle began at the usual time and last menstrual cycle ~1 month ago was lighter than normal.  She has heavy flow and was changing a pad an hour today, probably were not completely saturated.  She sometimes has heavy flow with menses and painful menses.  No vaginal discharge.  She is not been sexually active anytime recently and has no concerns about possibly being pregnant.  No other pertinent history or acute complaints or concerns.      Previous Records Reviewed:  US PELVIS COMPLETE TA AND TV    Anatomical Region Laterality Modality   Pelvis -- Ultrasound     Impression    1. The uterus is normal in appearance.  2. The right ovary was not identified in today's study.  3. The left ovary is normal in appearance.    Francy Hudson MD 3/5/2020 5:34 PM    [place] [id number] [facility] [id number]  [phone]    Narrative    Gynecologic Ultrasound    Date of exam: 3/5/2020  Indication for exam: Irregular menstrual cycle and pelvic pain,  Requesting Provider: Divya Marroquin MD    TECHNIQUE:  Transabdominal scan was performed. Transvaginal scan was performed for  improved visualization of the pelvic structures.    FINDINGS:  The uterus is present, anteverted, without deviation, and measures 6.2 x 3.4 x 4.8 cm.  The myometrium is homogeneous.  There is no evidence of intrauterine masses.  The endometrial thickness is 3.7 mm.  There are no myomas noted.  The right ovary is not identified due to bowel gas.  The left ovary is normal in appearance and measures 2.3 X 1.8 X 1.6 cm.  Free fluid in the cul de sac:  "None    1/30/24  INTERPRETATION/RESULT  (none) NEGATIVE FOR INTRAEPITHELIAL LESION OR MALIGNANCY (NIL)   Electronically signed by Denzel Zuñiga on 2/8/2024 at  7:13 AM   SPECIMEN ADEQUACY Satisfactory for evaluation  Endocervical component present  Scant cellularity   Date of LMP 1/29/2024   Last Pap Date 11/24/20   Last Pap Result NIL   Abnormal Pap or Sherman Bx in last 5 years No   Menstrual Status Regular Periods   Sherman Bx Done Today No   Additional Information None given   Comment: Cytology is screened at [facility],        Allergies:  No Known Allergies    Problem List:    Patient Active Problem List    Diagnosis Date Noted    Pubertal delay 12/15/2011     Priority: Medium    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  At Reyna's previous clinic, [facility], Reyna had a bone scan and was told that Reyna would grow to only about 5'2\" per Reyna's mom.  0.45% of growth percentile based on stature-for-age.  0.06% of growth percentile based on weight-for-age.  1.92% of growth percentile based on BMI-for-age.          Underweight 12/11/2008     Priority: Medium     April 6, 2009: Andreas Mom is 5'5\" and Reyna's Dad is 5'5\" .  At Reyna's previous clinic, [facility], Reyna had a bone scan and was told that Reyna would grow to only about 5'2\" per Reyna's mom.  0.45% of growth percentile based on stature-for-age.  0.06% of growth percentile based on weight-for-age.  1.92% of growth percentile based on BMI-for-age.        Anxiety NOS 12/11/2008     Priority: Medium     April 6, 2009: Reyna has been followed by psychologist Delia in [facility]. Reyna has completed her therapy. Reyna is doing well at school and Reyna's mom reports that Delia felt that no further " follow up is needed at this time.          Past Medical History:    No past medical history on file.    Past Surgical History:    No past surgical history on file.    Family History:    Family History   Problem Relation Age of Onset    Gastrointestinal Disease Mother         GERD    Lipids Maternal Grandmother     Allergies Maternal Grandmother         many med allergies    Heart Disease Maternal Grandfather     Heart Disease Paternal Grandmother         MI at 55yo       Social History:  Marital Status:  Single [1]  Social History     Tobacco Use    Smoking status: Never    Smokeless tobacco: Never   Substance Use Topics    Alcohol use: No    Drug use: No        Medications:    methocarbamol (ROBAXIN) 500 MG tablet  omeprazole (PRILOSEC) 10 MG DR capsule        Review of Systems  As mentioned in the HPI, in addition focused review of systems was negative.    Physical Exam   BP: 94/58  Pulse: 79  Temp: 98.3  F (36.8  C)  Resp: 16  Height: 152.4 cm (5')  Weight: 39.9 kg (88 lb)  SpO2: 100 %      Physical Exam  Vitals and nursing note reviewed.   Constitutional:       General: She is not in acute distress.     Appearance: Normal appearance. She is well-developed. She is not ill-appearing or diaphoretic.   Eyes:      General: No scleral icterus.     Extraocular Movements: Extraocular movements intact.      Conjunctiva/sclera: Conjunctivae normal.   Neck:      Trachea: No tracheal deviation.   Cardiovascular:      Rate and Rhythm: Normal rate and regular rhythm.      Heart sounds: Normal heart sounds. No murmur heard.     No friction rub. No gallop.   Pulmonary:      Effort: Pulmonary effort is normal. No respiratory distress.      Breath sounds: Normal breath sounds. No wheezing, rhonchi or rales.   Abdominal:      General: Bowel sounds are normal. There is no distension.      Palpations: Abdomen is soft. There is no mass.      Tenderness: There is no abdominal tenderness. There is no guarding or rebound.      Hernia:  No hernia is present.   Musculoskeletal:         General: No tenderness. Normal range of motion.      Right lower leg: No edema.      Left lower leg: No edema.   Skin:     General: Skin is warm and dry.      Coloration: Skin is not pale.      Findings: No erythema or rash.   Neurological:      Mental Status: She is alert.   Psychiatric:         Mood and Affect: Mood normal.         Behavior: Behavior normal.         ED Course        Procedures                Results for orders placed or performed during the hospital encounter of 09/14/24 (from the past 24 hour(s))   UA with Microscopic reflex to Culture    Specimen: Urine, Clean Catch   Result Value Ref Range    Color Urine Yellow Colorless, Straw, Light Yellow, Yellow    Appearance Urine Clear Clear    Glucose Urine Negative Negative mg/dL    Bilirubin Urine Negative Negative    Ketones Urine Negative Negative mg/dL    Specific Gravity Urine 1.026 1.003 - 1.035    Blood Urine Large (A) Negative    pH Urine 6.0 5.0 - 7.0    Protein Albumin Urine Negative Negative mg/dL    Urobilinogen Urine Normal Normal, 2.0 mg/dL    Nitrite Urine Negative Negative    Leukocyte Esterase Urine Negative Negative    Mucus Urine Present (A) None Seen /LPF    RBC Urine 18 (H) <=2 /HPF    WBC Urine 2 <=5 /HPF    Squamous Epithelials Urine 2 (H) <=1 /HPF    Narrative    Urine Culture not indicated   HCG qualitative urine   Result Value Ref Range    hCG Urine Qualitative Negative Negative   ABO/Rh type and screen    Narrative    The following orders were created for panel order ABO/Rh type and screen.  Procedure                               Abnormality         Status                     ---------                               -----------         ------                     Adult Type and Screen[[id number]]                            Final result                 Please view results for these tests on the individual orders.   CBC with Platelets & Differential    Narrative    The following  orders were created for panel order CBC with Platelets & Differential.  Procedure                               Abnormality         Status                     ---------                               -----------         ------                     CBC with platelets and d...[[id number]]                      Final result                 Please view results for these tests on the individual orders.   Basic metabolic panel   Result Value Ref Range    Sodium 140 135 - 145 mmol/L    Potassium 4.2 3.4 - 5.3 mmol/L    Chloride 106 98 - 107 mmol/L    Carbon Dioxide (CO2) 27 22 - 29 mmol/L    Anion Gap 7 7 - 15 mmol/L    Urea Nitrogen 19.0 6.0 - 20.0 mg/dL    Creatinine 0.76 0.51 - 0.95 mg/dL    GFR Estimate >90 >60 mL/min/1.73m2    Calcium 9.1 8.8 - 10.4 mg/dL    Glucose 105 (H) 70 - 99 mg/dL   [place] Draw    Narrative    The following orders were created for panel order [place] Draw.  Procedure                               Abnormality         Status                     ---------                               -----------         ------                     Extra Red Top Tube[[id number]]                               Final result               Extra Green Top (Lithium...[[id number]]                                                 Extra Purple Top Tube[[id number]]                                                         Please view results for these tests on the individual orders.   Adult Type and Screen   Result Value Ref Range    ABO/RH(D) A POS     Antibody Screen Negative Negative    SPECIMEN EXPIRATION DATE [id number]    CBC with platelets and differential   Result Value Ref Range    WBC Count 8.8 4.0 - 11.0 10e3/uL    RBC Count 3.96 3.80 - 5.20 10e6/uL    Hemoglobin 12.3 11.7 - 15.7 g/dL    Hematocrit 36.8 35.0 - 47.0 %    MCV 93 78 - 100 fL    MCH 31.1 26.5 - 33.0 pg    MCHC 33.4 31.5 - 36.5 g/dL    RDW 13.3 10.0 - 15.0 %    Platelet Count 220 150 - 450 10e3/uL    % Neutrophils 70 %    % Lymphocytes 21 %    % Monocytes 8  %    % Eosinophils 1 %    % Basophils 0 %    % Immature Granulocytes 0 %    NRBCs per 100 WBC 0 <1 /100    Absolute Neutrophils 6.2 1.6 - 8.3 10e3/uL    Absolute Lymphocytes 1.8 0.8 - 5.3 10e3/uL    Absolute Monocytes 0.7 0.0 - 1.3 10e3/uL    Absolute Eosinophils 0.1 0.0 - 0.7 10e3/uL    Absolute Basophils 0.0 0.0 - 0.2 10e3/uL    Absolute Immature Granulocytes 0.0 <=0.4 10e3/uL    Absolute NRBCs 0.0 10e3/uL   Extra Red Top Tube   Result Value Ref Range    Hold Specimen JIC    [place] Draw    Narrative    The following orders were created for panel order [place] Draw.  Procedure                               Abnormality         Status                     ---------                               -----------         ------                     Extra Blue Top Tube[[id number]]                              Final result                 Please view results for these tests on the individual orders.   Extra Blue Top Tube   Result Value Ref Range    Hold Specimen JIC        Medications   ibuprofen (ADVIL/MOTRIN) tablet 400 mg (400 mg Oral Not Given 9/14/24 2010)       Assessments & Plan (with Medical Decision Making)   24 year old female who is on day 5 of her menstrual cycle with 2 hours of severe pelvic/uterine cramping pain this afternoon which has now essentially resolved.  Menstrual cycle began at the usual time and last menstrual cycle ~1 month ago was lighter than normal.  She has heavy flow and was changing a pad an hour today, probably were not completely saturated.  She sometimes has heavy flow with menses and painful menses.   Pelvic pain resolved.  Benign exam with no current abdominal/pelvic/uterine tenderness.  Laboratory evaluation unremarkable and she is not pregnant.  Hemodynamically stable with normal hemoglobin.  No current indication for imaging evaluation/pelvic ultrasound.  Doubt ovarian torsion, PID, TOA, or other emergent disease process.  She was provided instructions for supportive care and use of NSAID  prn, and will return as needed for worsened condition or worsening symptoms, or new problems or concerns.       I have reviewed the nursing notes.    I have reviewed the findings, diagnosis, plan and need for follow up with the patient.    Medical Decision Making: Moderate complexity      Discharge Medication List as of 9/14/2024  8:08 PM          Final diagnoses:   Dysmenorrhea   Pelvic cramping       9/14/2024   [facility] EMERGENCY DEPT       Kolton Rogers MD  09/14/24 [id number]

## 2024-09-15 NOTE — ED NOTES
Patient reports heavier than normal periods and more severe period cramps than typical. States she started her period Monday and typically has cramps the second day. States she had an onset of cramps today that lasted 2 hours. Denies any medication changes. Is not on birth control.

## 2024-09-22 ENCOUNTER — HOSPITAL ENCOUNTER (EMERGENCY)
Facility: CLINIC | Age: 24
Discharge: HOME | End: 2024-09-22
Attending: FAMILY MEDICINE | Admitting: FAMILY MEDICINE
Payer: COMMERCIAL

## 2024-09-22 VITALS
SYSTOLIC BLOOD PRESSURE: 119 MMHG | WEIGHT: 90 LBS | TEMPERATURE: 97.2 F | DIASTOLIC BLOOD PRESSURE: 72 MMHG | RESPIRATION RATE: 16 BRPM | HEART RATE: 82 BPM | OXYGEN SATURATION: 100 % | BODY MASS INDEX: 17.67 KG/M2 | HEIGHT: 60 IN

## 2024-09-22 DIAGNOSIS — N93.9 VAGINAL BLEEDING: ICD-10-CM

## 2024-09-22 LAB
ANION GAP SERPL CALCULATED.3IONS-SCNC: 8 MMOL/L (ref 7–15)
BASOPHILS # BLD AUTO: 0 10E3/UL (ref 0–0.2)
BASOPHILS NFR BLD AUTO: 0 %
BUN SERPL-MCNC: 17.9 MG/DL (ref 6–20)
CALCIUM SERPL-MCNC: 9.2 MG/DL (ref 8.8–10.4)
CHLORIDE SERPL-SCNC: 104 MMOL/L (ref 98–107)
CREAT SERPL-MCNC: 0.78 MG/DL (ref 0.51–0.95)
EGFRCR SERPLBLD CKD-EPI 2021: >90 ML/MIN/1.73M2
EOSINOPHIL # BLD AUTO: 0 10E3/UL (ref 0–0.7)
EOSINOPHIL NFR BLD AUTO: 1 %
ERYTHROCYTE [DISTWIDTH] IN BLOOD BY AUTOMATED COUNT: 13.1 % (ref 10–15)
GLUCOSE SERPL-MCNC: 93 MG/DL (ref 70–99)
HCG SERPL QL: NEGATIVE
HCO3 SERPL-SCNC: 26 MMOL/L (ref 22–29)
HCT VFR BLD AUTO: 36.8 % (ref 35–47)
HGB BLD-MCNC: 12.3 G/DL (ref 11.7–15.7)
IMM GRANULOCYTES # BLD: 0 10E3/UL
IMM GRANULOCYTES NFR BLD: 0 %
LYMPHOCYTES # BLD AUTO: 1.6 10E3/UL (ref 0.8–5.3)
LYMPHOCYTES NFR BLD AUTO: 34 %
MCH RBC QN AUTO: 31.1 PG (ref 26.5–33)
MCHC RBC AUTO-ENTMCNC: 33.4 G/DL (ref 31.5–36.5)
MCV RBC AUTO: 93 FL (ref 78–100)
MONOCYTES # BLD AUTO: 0.5 10E3/UL (ref 0–1.3)
MONOCYTES NFR BLD AUTO: 12 %
NEUTROPHILS # BLD AUTO: 2.5 10E3/UL (ref 1.6–8.3)
NEUTROPHILS NFR BLD AUTO: 52 %
NRBC # BLD AUTO: 0 10E3/UL
NRBC BLD AUTO-RTO: 0 /100
PLATELET # BLD AUTO: 226 10E3/UL (ref 150–450)
POTASSIUM SERPL-SCNC: 3.9 MMOL/L (ref 3.4–5.3)
RBC # BLD AUTO: 3.96 10E6/UL (ref 3.8–5.2)
SODIUM SERPL-SCNC: 138 MMOL/L (ref 135–145)
WBC # BLD AUTO: 4.7 10E3/UL (ref 4–11)

## 2024-09-22 PROCEDURE — 36415 COLL VENOUS BLD VENIPUNCTURE: CPT | Performed by: FAMILY MEDICINE

## 2024-09-22 PROCEDURE — 84703 CHORIONIC GONADOTROPIN ASSAY: CPT | Performed by: FAMILY MEDICINE

## 2024-09-22 PROCEDURE — 99283 EMERGENCY DEPT VISIT LOW MDM: CPT | Performed by: FAMILY MEDICINE

## 2024-09-22 PROCEDURE — 85025 COMPLETE CBC W/AUTO DIFF WBC: CPT | Performed by: FAMILY MEDICINE

## 2024-09-22 PROCEDURE — 99284 EMERGENCY DEPT VISIT MOD MDM: CPT | Performed by: FAMILY MEDICINE

## 2024-09-22 PROCEDURE — 80048 BASIC METABOLIC PNL TOTAL CA: CPT | Performed by: FAMILY MEDICINE

## 2024-09-22 RX ORDER — TRANEXAMIC ACID 650 MG/1
1300 TABLET ORAL 2 TIMES DAILY
Qty: 20 TABLET | Refills: 0 | Status: SHIPPED | OUTPATIENT
Start: 2024-09-22 | End: 2024-09-27

## 2024-09-22 ASSESSMENT — COLUMBIA-SUICIDE SEVERITY RATING SCALE - C-SSRS
1. IN THE PAST MONTH, HAVE YOU WISHED YOU WERE DEAD OR WISHED YOU COULD GO TO SLEEP AND NOT WAKE UP?: NO
2. HAVE YOU ACTUALLY HAD ANY THOUGHTS OF KILLING YOURSELF IN THE PAST MONTH?: NO
6. HAVE YOU EVER DONE ANYTHING, STARTED TO DO ANYTHING, OR PREPARED TO DO ANYTHING TO END YOUR LIFE?: NO

## 2024-09-22 ASSESSMENT — ACTIVITIES OF DAILY LIVING (ADL)
ADLS_ACUITY_SCORE: 35
ADLS_ACUITY_SCORE: 35

## 2024-09-22 NOTE — ED PROVIDER NOTES
/73   Pulse 82   Temp 97.2  F (36.2  C) (Oral)   Resp 16   SpO2 100%     Reyna Rose is a 24-year-old female presenting with complaints of continued vaginal bleeding.  Patient reports she started her menstrual cycle on 9/9/2024 she reports this is usually regular.  However she had continued bleeding and was seen on 9/14/2024 in the ER.  Patient was diagnosed with dysmenorrhea and pelvic cramping at that time.  Patient returns as she has continued bright red vaginal bleeding that has not slowed.  Given patient's history, I recommended he/she be evaluated in the emergency department.  We discussed that he/she will likely require further testing and/or treatment beyond the capabilities of the current urgent care setting including labs and potential imaging.  Patient expresses understanding of this and agreement with the plan.  I have explained what could happen if they choose to not have the treatment/transfer.        Brianna Dick PA-C  09/22/24 1100

## 2024-09-22 NOTE — ED PROVIDER NOTES
"  History     Chief Complaint   Patient presents with    Vaginal Bleeding     Pt reports menses started on 9/9 and continues to have bleeding. Pt reports she is going through 4 pads a day, bright red blood, pt denies clots. Pt denies pain at this time. Pt concerned for ongoing bleeding which is not normal for her. Pt on not on any contraceptives at this time      HPI    Reyna Rose is a 24 year old female who comes in with persistent vaginal bleeding.  She started having vaginal bleeding September 9 and its persisted since that time.  Her her prior menstrual cycle in early August was on time but was much lighter than normal and just consisted of light spotting.  She then started bleeding again about a month later about 3 days late for her usual and has had persistent bleeding since then.  She is not pregnant.  She is however sexually active.  She is not using any contraception.  We discussed that there is a high potential for pregnancy having intercourse without contraception and if she does not want to be pregnant, which she says she does not want, she will need to take measures to prevent that.  She is not having any other bleeding.  She is not having any epistaxis, hematemesis, melena, hematochezia, hematuria.  Currently she reports taking no prescription medications.    Allergies:  No Known Allergies    Problem List:    Patient Active Problem List    Diagnosis Date Noted    Pubertal delay 12/15/2011     Priority: Medium    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  At Reyna's previous clinic[facility] [place] and was told that Reyna would grow to only about 5'2\" per Reyna's mom.  0.45% of growth percentile based on stature-for-age.  0.06% of growth percentile " "based on weight-for-age.  1.92% of growth percentile based on BMI-for-age.          Underweight 12/11/2008     Priority: Medium     April 6, 2009: Reyna's Mom is 5'5\" and Reyna's Dad is 5'5\" .  At Reyna's previous clinic, [facility], Reyna had a bone scan and was told that Reyna would grow to only about 5'2\" per Reyna's mom.  0.45% of growth percentile based on stature-for-age.  0.06% of growth percentile based on weight-for-age.  1.92% of growth percentile based on BMI-for-age.        Anxiety NOS 12/11/2008     Priority: Medium     April 6, 2009: Reyna has been followed by psychologist Delia in [facility]. Reyna has completed her therapy. Reyna is doing well at school and Reyna's mom reports that Delia felt that no further follow up is needed at this time.          Past Medical History:    No past medical history on file.    Past Surgical History:    No past surgical history on file.    Family History:    Family History   Problem Relation Age of Onset    Gastrointestinal Disease Mother         GERD    Lipids Maternal Grandmother     Allergies Maternal Grandmother         many med allergies    Heart Disease Maternal Grandfather     Heart Disease Paternal Grandmother         MI at 55yo       Social History:  Marital Status:  Single [1]  Social History     Tobacco Use    Smoking status: Never    Smokeless tobacco: Never   Substance Use Topics    Alcohol use: No    Drug use: No        Medications:    methocarbamol (ROBAXIN) 500 MG tablet  omeprazole (PRILOSEC) 10 MG DR capsule  tranexamic acid (LYSTEDA) 650 MG tablet          Review of Systems  All other systems are reviewed and are negative    Physical Exam   BP: 119/73  Pulse: 82  Temp: 97.2  F (36.2  C)  Resp: 16  Height: 152.4 cm (5')  Weight: 40.8 kg (90 lb)  SpO2: 100 %      Physical Exam    Nursing note and vitals were reviewed.  Constitutional: Awake and alert, adequately nourished and developed appearing 24-year-old in no apparent " discomfort, who does not appear acutely ill, and who answers questions appropriately and cooperates with examination.  HEENT: Speech is fluent.  Voice quality is normal.  EOMI.   Pulmonary/Chest: Breathing is unlabored.  Abdomen: Soft, nontender, no HSM or masses rebound or guarding.  Musculoskeletal: Extremities are warm and well-perfused and without edema  Neurological: Alert, oriented, thought content logical, coherent   Skin: Warm, dry, no rashes.  Psychiatric: Affect broad and appropriate.    ED Course        Procedures              Critical Care time:  none               Results for orders placed or performed during the hospital encounter of 09/22/24 (from the past 24 hour(s))   CBC with platelets differential    Narrative    The following orders were created for panel order CBC with platelets differential.  Procedure                               Abnormality         Status                     ---------                               -----------         ------                     CBC with platelets and d...[[id number]]                      Final result                 Please view results for these tests on the individual orders.   Basic metabolic panel   Result Value Ref Range    Sodium 138 135 - 145 mmol/L    Potassium 3.9 3.4 - 5.3 mmol/L    Chloride 104 98 - 107 mmol/L    Carbon Dioxide (CO2) 26 22 - 29 mmol/L    Anion Gap 8 7 - 15 mmol/L    Urea Nitrogen 17.9 6.0 - 20.0 mg/dL    Creatinine 0.78 0.51 - 0.95 mg/dL    GFR Estimate >90 >60 mL/min/1.73m2    Calcium 9.2 8.8 - 10.4 mg/dL    Glucose 93 70 - 99 mg/dL   HCG qualitative Blood   Result Value Ref Range    hCG Serum Qualitative Negative Negative   CBC with platelets and differential   Result Value Ref Range    WBC Count 4.7 4.0 - 11.0 10e3/uL    RBC Count 3.96 3.80 - 5.20 10e6/uL    Hemoglobin 12.3 11.7 - 15.7 g/dL    Hematocrit 36.8 35.0 - 47.0 %    MCV 93 78 - 100 fL    MCH 31.1 26.5 - 33.0 pg    MCHC 33.4 31.5 - 36.5 g/dL    RDW 13.1 10.0 - 15.0 %     Platelet Count 226 150 - 450 10e3/uL    % Neutrophils 52 %    % Lymphocytes 34 %    % Monocytes 12 %    % Eosinophils 1 %    % Basophils 0 %    % Immature Granulocytes 0 %    NRBCs per 100 WBC 0 <1 /100    Absolute Neutrophils 2.5 1.6 - 8.3 10e3/uL    Absolute Lymphocytes 1.6 0.8 - 5.3 10e3/uL    Absolute Monocytes 0.5 0.0 - 1.3 10e3/uL    Absolute Eosinophils 0.0 0.0 - 0.7 10e3/uL    Absolute Basophils 0.0 0.0 - 0.2 10e3/uL    Absolute Immature Granulocytes 0.0 <=0.4 10e3/uL    Absolute NRBCs 0.0 10e3/uL       Medications - No data to display    Assessments & Plan (with Medical Decision Making)     44-year-old nulligravida presented with vaginal bleeding for 2 weeks as described above with previous.  That was abnormally light.  From her description it sounds like she had an anovulatory cycle.  She is not pregnant.  However she is also not contraceptive and does not desire to be pregnant and is sexually active.  We discussed there is a high risk of becoming pregnant in this circumstance and if she wishes not to she should be on contraception.  For her bleeding now we recommended initiating oral contraceptive to reduce the bleeding as well as tranexamic acid.  She declines oral contraceptive medication because she said she was on it 3 years ago and had side effects and discontinued it.  I have prescribed tranexamic acid.  Her CBC is normal and I am not concerned about worrisome blood loss.  I recommended follow-up in OB/GYN and placed a referral order.    I have reviewed the nursing notes.    I have reviewed the findings, diagnosis, plan and need for follow up with the patient.           New Prescriptions    TRANEXAMIC ACID (LYSTEDA) 650 MG TABLET    Take 2 tablets (1,300 mg) by mouth 2 times daily for 5 days.       Final diagnoses:   Vaginal bleeding       9/22/2024   [facility] EMERGENCY DEPT       Demarcus Prado MD  09/22/24 1836

## 2024-09-22 NOTE — ED TRIAGE NOTES
Pt reports menses started on 9/9 and continues to have bleeding. Pt reports she is going through 4 pads a day, bright red blood, pt denies clots. Pt denies pain at this time. Pt concerned for ongoing bleeding which is not normal for her. Pt on not on any contraceptives at this time

## 2024-10-25 ENCOUNTER — OFFICE VISIT (OUTPATIENT)
Dept: OBGYN | Facility: CLINIC | Age: 24
End: 2024-10-25
Attending: FAMILY MEDICINE
Payer: COMMERCIAL

## 2024-10-25 VITALS
RESPIRATION RATE: 16 BRPM | TEMPERATURE: 98.1 F | WEIGHT: 88 LBS | BODY MASS INDEX: 17.28 KG/M2 | DIASTOLIC BLOOD PRESSURE: 57 MMHG | HEIGHT: 60 IN | SYSTOLIC BLOOD PRESSURE: 101 MMHG | HEART RATE: 73 BPM

## 2024-10-25 DIAGNOSIS — N92.6 IRREGULAR PERIODS: Primary | ICD-10-CM

## 2024-10-25 DIAGNOSIS — N93.9 VAGINAL BLEEDING: ICD-10-CM

## 2024-10-25 LAB
HCG UR QL: NEGATIVE
INTERNAL QC OK POCT: NORMAL
POCT KIT EXPIRATION DATE: NORMAL
POCT KIT LOT NUMBER: NORMAL

## 2024-10-25 PROCEDURE — 99203 OFFICE O/P NEW LOW 30 MIN: CPT | Performed by: ADVANCED PRACTICE MIDWIFE

## 2024-10-25 PROCEDURE — 81025 URINE PREGNANCY TEST: CPT | Performed by: ADVANCED PRACTICE MIDWIFE

## 2024-10-25 NOTE — NURSING NOTE
Initial /57 (BP Location: Right arm, Patient Position: Chair, Cuff Size: Adult Regular)   Pulse 73   Temp 98.1  F (36.7  C) (Tympanic)   Resp 16   Ht 1.524 m (5')   Wt 39.9 kg (88 lb)   LMP 10/13/2024   BMI 17.19 kg/m   Estimated body mass index is 17.19 kg/m  as calculated from the following:    Height as of this encounter: 1.524 m (5').    Weight as of this encounter: 39.9 kg (88 lb). .    
To get better and follow your care plan as instructed.

## 2024-10-25 NOTE — PROGRESS NOTES
"S:  Reyna is here to due to irregular periods. She had spotting in August and then \"double periods\" in September and October.  She went to the ED and hemoglobin was normal with no signs of anemia.  She is sexually active and uses condoms. She has a history of heavy painful periods.  She used to take birth control pills for 5 years and she said that they had many side effects. She is just finishing training as a .  She has no concerns about STIs or infections.  O:  Appears well, no distress  Body mass index is 17.19 kg/m .  UPT negative   A/P  (N93.9) Vaginal bleeding  Irregular periods   Comment:   Plan: HCG qualitative urine POCT, US Pelvic Complete         with Transvaginal  Pelvic US ordered.  Discussed hormonal contraceptive to prevent pregnancy and manage bleeding.  She is not interested at this time   Discussed irregular periods when overweight, underweight or for stress or unknown reasons.    She will let us know if she needs any further assitance.        "

## 2024-10-27 ENCOUNTER — HOSPITAL ENCOUNTER (EMERGENCY)
Facility: CLINIC | Age: 24
Discharge: HOME | End: 2024-10-27
Attending: PHYSICIAN ASSISTANT | Admitting: PHYSICIAN ASSISTANT
Payer: COMMERCIAL

## 2024-10-27 ENCOUNTER — APPOINTMENT (OUTPATIENT)
Dept: GENERAL RADIOLOGY | Facility: CLINIC | Age: 24
End: 2024-10-27
Attending: PHYSICIAN ASSISTANT
Payer: COMMERCIAL

## 2024-10-27 VITALS
HEART RATE: 76 BPM | DIASTOLIC BLOOD PRESSURE: 57 MMHG | OXYGEN SATURATION: 99 % | RESPIRATION RATE: 16 BRPM | SYSTOLIC BLOOD PRESSURE: 104 MMHG | TEMPERATURE: 97.5 F

## 2024-10-27 DIAGNOSIS — M79.641 PAIN OF RIGHT HAND: ICD-10-CM

## 2024-10-27 DIAGNOSIS — S67.21XA CRUSHING INJURY OF RIGHT HAND, INITIAL ENCOUNTER: ICD-10-CM

## 2024-10-27 PROCEDURE — 99213 OFFICE O/P EST LOW 20 MIN: CPT | Performed by: PHYSICIAN ASSISTANT

## 2024-10-27 PROCEDURE — 73130 X-RAY EXAM OF HAND: CPT | Mod: RT

## 2024-10-27 PROCEDURE — G0463 HOSPITAL OUTPT CLINIC VISIT: HCPCS | Performed by: PHYSICIAN ASSISTANT

## 2024-10-27 ASSESSMENT — ACTIVITIES OF DAILY LIVING (ADL)
ADLS_ACUITY_SCORE: 0
ADLS_ACUITY_SCORE: 0

## 2024-10-27 ASSESSMENT — ENCOUNTER SYMPTOMS: CONSTITUTIONAL NEGATIVE: 1
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
## 2025-01-13 ENCOUNTER — HOSPITAL ENCOUNTER (EMERGENCY)
Facility: CLINIC | Age: 25
Discharge: HOME | End: 2025-01-13
Attending: FAMILY MEDICINE | Admitting: FAMILY MEDICINE
Payer: COMMERCIAL

## 2025-01-13 VITALS
OXYGEN SATURATION: 99 % | TEMPERATURE: 98.2 F | RESPIRATION RATE: 18 BRPM | SYSTOLIC BLOOD PRESSURE: 106 MMHG | DIASTOLIC BLOOD PRESSURE: 67 MMHG | HEART RATE: 86 BPM

## 2025-01-13 DIAGNOSIS — H57.8A3 FOREIGN BODY SENSATION, BILATERAL EYES: ICD-10-CM

## 2025-01-13 PROCEDURE — 99283 EMERGENCY DEPT VISIT LOW MDM: CPT | Performed by: FAMILY MEDICINE

## 2025-01-13 PROCEDURE — 99282 EMERGENCY DEPT VISIT SF MDM: CPT | Performed by: FAMILY MEDICINE

## 2025-01-13 RX ORDER — TETRACAINE HYDROCHLORIDE 5 MG/ML
1-2 SOLUTION OPHTHALMIC ONCE
Status: DISCONTINUED | OUTPATIENT
Start: 2025-01-13 | End: 2025-01-13

## 2025-01-13 ASSESSMENT — VISUAL ACUITY
OD: 20/50;WITHOUT CORRECTIVE LENSES
OS: 20/100;WITHOUT CORRECTIVE LENSES

## 2025-01-13 ASSESSMENT — ACTIVITIES OF DAILY LIVING (ADL): ADLS_ACUITY_SCORE: 41
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
## 2025-01-14 NOTE — DISCHARGE INSTRUCTIONS
If you have any persistent foreign body sensation or other visual concerns after 24 hours please return to the emergency department for ophthalmology clinic if available.
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
